# Patient Record
Sex: MALE | Race: WHITE | Employment: UNEMPLOYED | URBAN - METROPOLITAN AREA
[De-identification: names, ages, dates, MRNs, and addresses within clinical notes are randomized per-mention and may not be internally consistent; named-entity substitution may affect disease eponyms.]

---

## 2017-06-11 ENCOUNTER — HOSPITAL ENCOUNTER (INPATIENT)
Age: 57
LOS: 7 days | Discharge: PSYCHIATRIC OR SUBSTANCE USE FACILITY | DRG: 885 | End: 2017-06-19
Attending: EMERGENCY MEDICINE | Admitting: PSYCHIATRY & NEUROLOGY
Payer: OTHER GOVERNMENT

## 2017-06-11 DIAGNOSIS — F10.920 ALCOHOL INTOXICATION, UNCOMPLICATED (HCC): ICD-10-CM

## 2017-06-11 DIAGNOSIS — R45.851 SUICIDAL IDEATION: Primary | ICD-10-CM

## 2017-06-11 LAB
ALBUMIN SERPL BCP-MCNC: 3.7 G/DL (ref 3.4–5)
ALBUMIN/GLOB SERPL: 0.9 {RATIO} (ref 0.8–1.7)
ALP SERPL-CCNC: 128 U/L (ref 45–117)
ALT SERPL-CCNC: 146 U/L (ref 16–61)
AMPHET UR QL SCN: NEGATIVE
ANION GAP BLD CALC-SCNC: 9 MMOL/L (ref 3–18)
AST SERPL W P-5'-P-CCNC: 169 U/L (ref 15–37)
BARBITURATES UR QL SCN: NEGATIVE
BASOPHILS # BLD AUTO: 0 K/UL (ref 0–0.06)
BASOPHILS # BLD: 0 % (ref 0–2)
BENZODIAZ UR QL: NEGATIVE
BILIRUB SERPL-MCNC: 0.4 MG/DL (ref 0.2–1)
BUN SERPL-MCNC: 7 MG/DL (ref 7–18)
BUN/CREAT SERPL: 13 (ref 12–20)
CALCIUM SERPL-MCNC: 8.7 MG/DL (ref 8.5–10.1)
CANNABINOIDS UR QL SCN: NEGATIVE
CHLORIDE SERPL-SCNC: 103 MMOL/L (ref 100–108)
CO2 SERPL-SCNC: 27 MMOL/L (ref 21–32)
COCAINE UR QL SCN: NEGATIVE
CREAT SERPL-MCNC: 0.56 MG/DL (ref 0.6–1.3)
DIFFERENTIAL METHOD BLD: ABNORMAL
EOSINOPHIL # BLD: 0.2 K/UL (ref 0–0.4)
EOSINOPHIL NFR BLD: 3 % (ref 0–5)
ERYTHROCYTE [DISTWIDTH] IN BLOOD BY AUTOMATED COUNT: 14.5 % (ref 11.6–14.5)
ETHANOL SERPL-MCNC: 139 MG/DL (ref 0–3)
ETHANOL SERPL-MCNC: 299 MG/DL (ref 0–3)
GLOBULIN SER CALC-MCNC: 4 G/DL (ref 2–4)
GLUCOSE SERPL-MCNC: 110 MG/DL (ref 74–99)
HCT VFR BLD AUTO: 42.9 % (ref 36–48)
HDSCOM,HDSCOM: NORMAL
HGB BLD-MCNC: 15.5 G/DL (ref 13–16)
LYMPHOCYTES # BLD AUTO: 32 % (ref 21–52)
LYMPHOCYTES # BLD: 2.5 K/UL (ref 0.9–3.6)
MCH RBC QN AUTO: 31.4 PG (ref 24–34)
MCHC RBC AUTO-ENTMCNC: 36.1 G/DL (ref 31–37)
MCV RBC AUTO: 87 FL (ref 74–97)
METHADONE UR QL: NEGATIVE
MONOCYTES # BLD: 0.5 K/UL (ref 0.05–1.2)
MONOCYTES NFR BLD AUTO: 6 % (ref 3–10)
NEUTS SEG # BLD: 4.6 K/UL (ref 1.8–8)
NEUTS SEG NFR BLD AUTO: 59 % (ref 40–73)
OPIATES UR QL: NEGATIVE
PCP UR QL: NEGATIVE
PLATELET # BLD AUTO: 84 K/UL (ref 135–420)
PMV BLD AUTO: 9.7 FL (ref 9.2–11.8)
POTASSIUM SERPL-SCNC: 4.1 MMOL/L (ref 3.5–5.5)
PROT SERPL-MCNC: 7.7 G/DL (ref 6.4–8.2)
RBC # BLD AUTO: 4.93 M/UL (ref 4.7–5.5)
SODIUM SERPL-SCNC: 139 MMOL/L (ref 136–145)
WBC # BLD AUTO: 7.8 K/UL (ref 4.6–13.2)

## 2017-06-11 PROCEDURE — 85025 COMPLETE CBC W/AUTO DIFF WBC: CPT | Performed by: EMERGENCY MEDICINE

## 2017-06-11 PROCEDURE — 99285 EMERGENCY DEPT VISIT HI MDM: CPT

## 2017-06-11 PROCEDURE — 74011250637 HC RX REV CODE- 250/637: Performed by: EMERGENCY MEDICINE

## 2017-06-11 PROCEDURE — 80307 DRUG TEST PRSMV CHEM ANLYZR: CPT | Performed by: EMERGENCY MEDICINE

## 2017-06-11 PROCEDURE — 80053 COMPREHEN METABOLIC PANEL: CPT | Performed by: EMERGENCY MEDICINE

## 2017-06-11 RX ORDER — THERA TABS 400 MCG
1 TAB ORAL
Status: COMPLETED | OUTPATIENT
Start: 2017-06-11 | End: 2017-06-11

## 2017-06-11 RX ORDER — LANOLIN ALCOHOL/MO/W.PET/CERES
100 CREAM (GRAM) TOPICAL
Status: COMPLETED | OUTPATIENT
Start: 2017-06-11 | End: 2017-06-11

## 2017-06-11 RX ORDER — FOLIC ACID 1 MG/1
1 TABLET ORAL
Status: COMPLETED | OUTPATIENT
Start: 2017-06-11 | End: 2017-06-11

## 2017-06-11 RX ADMIN — THERA TABS 1 TABLET: TAB at 14:01

## 2017-06-11 RX ADMIN — FOLIC ACID 1 MG: 1 TABLET ORAL at 14:01

## 2017-06-11 RX ADMIN — Medication 100 MG: at 14:00

## 2017-06-11 NOTE — ED PROVIDER NOTES
HPI Comments: Pt with history of COPD, presents to ED with complaint of suicidal ideations and \"drinking too much\". He denies any current dyspnea, no cough or wheezing. No fevers or chills, no chest pain or palpitations. He states he has been admitted to the behavioral unit before and \"I think I need to come back in\". He denies any history of alcohol withdrawal seizures or DTs. No other acute symptoms or complaints were noted. Past Medical History:   Diagnosis Date    Chronic obstructive pulmonary disease ([facility] [address]        History reviewed. No pertinent surgical history. History reviewed. No pertinent family history. Social History     Social History    Marital status: SINGLE     Spouse name: N/A    Number of children: N/A    Years of education: N/A     Occupational History    Not on file. Social History Main Topics    Smoking status: Current Every Day Smoker    Smokeless tobacco: Not on file    Alcohol use Yes    Drug use: No    Sexual activity: Not on file     Other Topics Concern    Not on file     Social History Narrative         ALLERGIES: Review of patient's allergies indicates no known allergies. Review of Systems   Constitutional: Negative for chills, diaphoresis and fever. HENT: Negative. Eyes: Negative for visual disturbance. Respiratory: Negative for chest tightness and shortness of breath. Cardiovascular: Negative for chest pain, palpitations and leg swelling. Gastrointestinal: Negative for abdominal pain, nausea and vomiting. Endocrine: Negative. Genitourinary: Negative for dysuria and hematuria. Musculoskeletal: Negative. Skin: Negative for pallor. Allergic/Immunologic: Negative. Neurological: Negative for dizziness, syncope and headaches. Psychiatric/Behavioral: Positive for dysphoric mood and suicidal ideas. Negative for self-injury.        Vitals:    06/11/17 1224 06/11/17 1252   BP: (!) 137/94 (!) 132/98   Pulse: 87 88   Resp: 16 16 Temp: 98.2 °F (36.8 °C)    SpO2: 98% 98%   Weight: 68 kg (150 lb)             Physical Exam   Constitutional: He is oriented to person, place, and time. He appears well-developed and well-nourished. No distress. Resting comfortably on stretcher   HENT:   Head: Normocephalic and atraumatic. MM moist   Eyes: Conjunctivae and EOM are normal. No scleral icterus. Sclera clear bilaterally   Neck: Normal range of motion. Neck supple. No JVD present. Non-tender to palpation   Cardiovascular: Normal rate, regular rhythm and normal heart sounds. Exam reveals no gallop and no friction rub. No murmur heard. Pulmonary/Chest: Effort normal and breath sounds normal. No respiratory distress. He has no wheezes. He has no rales. He exhibits no tenderness. No crepitance with palpation   Abdominal: Soft. He exhibits no distension. There is no tenderness. Genitourinary:   Genitourinary Comments: No CVA tenderness   Musculoskeletal: He exhibits no edema or tenderness. Normal inspection of upper extremities. No edema noted to bilateral lower extremities   Lymphadenopathy:     He has no cervical adenopathy. Neurological: He is alert and oriented to person, place, and time. No cranial nerve deficit. He exhibits normal muscle tone. Normal motor and sensation bilaterally to upper and lower extremities   Skin: Skin is warm and dry. No rash noted. He is not diaphoretic. Psychiatric:   Normal mood and affect. +SI   Vitals reviewed. MDM  Number of Diagnoses or Management Options  Alcohol intoxication, uncomplicated ([address]):   Suicidal ideation:   Diagnosis management comments: Pt with history of alcohol abuse, presents with complaint of suicidal ideations. Benign exam.  Pt is medically stable for crisis evaluation. Will consult crisis when sober. 6:32 PM discussed with crisis, will evaluate pt in ED.        Amount and/or Complexity of Data Reviewed  Clinical lab tests: ordered and reviewed  Decide to obtain previous medical records or to obtain history from someone other than the patient: yes  Obtain history from someone other than the patient: yes  Independent visualization of images, tracings, or specimens: yes    Risk of Complications, Morbidity, and/or Mortality  Presenting problems: moderate  Management options: moderate    Patient Progress  Patient progress: stable    ED Course       Procedures    Note:  Assuming care of patient at beginning of shift    7:13 PM  Trav HERMAN MD, assumed care of patient at the beginning of my shift. No current facility-administered medications for this encounter. Current Outpatient Prescriptions   Medication Sig    traZODone (DESYREL) 100 mg tablet Take 1 Tab by mouth nightly as needed for Sleep. Indications: Sleep       Past Medical History:   Diagnosis Date    Chronic obstructive pulmonary disease ([facility] [address]        History reviewed. No pertinent surgical history. History reviewed. No pertinent family history. Social History     Social History    Marital status: SINGLE     Spouse name: N/A    Number of children: N/A    Years of education: N/A     Occupational History    Not on file. Social History Main Topics    Smoking status: Current Every Day Smoker    Smokeless tobacco: Not on file    Alcohol use Yes    Drug use: No    Sexual activity: Not on file     Other Topics Concern    Not on file     Social History Narrative       No Known Allergies    Patient's primary care provider (as noted in EPIC):  None    Abnormal lab results from this emergency department encounter:  Labs Reviewed   CBC WITH AUTOMATED DIFF - Abnormal; Notable for the following:        Result Value    PLATELET 84 (*)     All other components within normal limits   METABOLIC PANEL, COMPREHENSIVE - Abnormal; Notable for the following:     Glucose 110 (*)     Creatinine 0.56 (*)     ALT (SGPT) 146 (*)     AST (SGOT) 169 (*)     Alk.  phosphatase 128 (*)     All other components within normal limits   ETHYL ALCOHOL - Abnormal; Notable for the following:     ALCOHOL(ETHYL),SERUM 299 (*)     All other components within normal limits   DRUG SCREEN, URINE   ETHYL ALCOHOL       Lab values for this patient within approximately the last 12 hours:  Recent Results (from the past 12 hour(s))   CBC WITH AUTOMATED DIFF    Collection Time: 06/11/17 12:35 PM   Result Value Ref Range    WBC 7.8 4.6 - 13.2 K/uL    RBC 4.93 4.70 - 5.50 M/uL    HGB 15.5 13.0 - 16.0 g/dL    HCT 42.9 36.0 - 48.0 %    MCV 87.0 74.0 - 97.0 FL    MCH 31.4 24.0 - 34.0 PG    MCHC 36.1 31.0 - 37.0 g/dL    RDW 14.5 11.6 - 14.5 %    PLATELET 84 (L) 483 - 420 K/uL    MPV 9.7 9.2 - 11.8 FL    NEUTROPHILS 59 40 - 73 %    LYMPHOCYTES 32 21 - 52 %    MONOCYTES 6 3 - 10 %    EOSINOPHILS 3 0 - 5 %    BASOPHILS 0 0 - 2 %    ABS. NEUTROPHILS 4.6 1.8 - 8.0 K/UL    ABS. LYMPHOCYTES 2.5 0.9 - 3.6 K/UL    ABS. MONOCYTES 0.5 0.05 - 1.2 K/UL    ABS. EOSINOPHILS 0.2 0.0 - 0.4 K/UL    ABS. BASOPHILS 0.0 0.0 - 0.06 K/UL    DF AUTOMATED     METABOLIC PANEL, COMPREHENSIVE    Collection Time: 06/11/17 12:35 PM   Result Value Ref Range    Sodium 139 136 - 145 mmol/L    Potassium 4.1 3.5 - 5.5 mmol/L    Chloride 103 100 - 108 mmol/L    CO2 27 21 - 32 mmol/L    Anion gap 9 3.0 - 18 mmol/L    Glucose 110 (H) 74 - 99 mg/dL    BUN 7 7.0 - 18 MG/DL    Creatinine 0.56 (L) 0.6 - 1.3 MG/DL    BUN/Creatinine ratio 13 12 - 20      GFR est AA >60 >60 ml/min/1.73m2    GFR est non-AA >60 >60 ml/min/1.73m2    Calcium 8.7 8.5 - 10.1 MG/DL    Bilirubin, total 0.4 0.2 - 1.0 MG/DL    ALT (SGPT) 146 (H) 16 - 61 U/L    AST (SGOT) 169 (H) 15 - 37 U/L    Alk.  phosphatase 128 (H) 45 - 117 U/L    Protein, total 7.7 6.4 - 8.2 g/dL    Albumin 3.7 3.4 - 5.0 g/dL    Globulin 4.0 2.0 - 4.0 g/dL    A-G Ratio 0.9 0.8 - 1.7     ETHYL ALCOHOL    Collection Time: 06/11/17 12:35 PM   Result Value Ref Range    ALCOHOL(ETHYL),SERUM 299 (H) 0 - 3 MG/DL   DRUG SCREEN, URINE    Collection Time: 06/11/17 12:42 PM   Result Value Ref Range    BENZODIAZEPINE NEGATIVE  NEG      BARBITURATES NEGATIVE  NEG      THC (TH-CANNABINOL) NEGATIVE  NEG      OPIATES NEGATIVE  NEG      PCP(PHENCYCLIDINE) NEGATIVE  NEG      COCAINE NEGATIVE  NEG      AMPHETAMINE NEGATIVE  NEG      METHADONE NEGATIVE  NEG      HDSCOM (NOTE)        Radiologist and cardiologist interpretations if available at time of this note:  Radiology results:  No results found. Medication(s) ordered for patient during this emergency visit encounter:  Medications   folic acid (FOLVITE) tablet 1 mg (1 mg Oral Given 6/11/17 1401)   therapeutic multivitamin (THERAGRAN) tablet 1 Tab (1 Tab Oral Given 6/11/17 1401)   thiamine (B-1) tablet 100 mg (100 mg Oral Given 6/11/17 1400)       Pt care assumed from Dr. Jerod Gan , ED provider. Pt complaint(s), current treatment plan, progression and available diagnostic results have been discussed thoroughly. Rounding occurred: no  Intended Disposition: TBD   Pending diagnostic reports and/or labs (please list): Evaluation by [facility] crisis worker and their recommendations. 11:50 PM  Spoke to [place] crisis worker, and she has no ED coverage. Will see this patient in the AM.     Diagnoses:  1. Suicidal ideation  2. Alcohol abuse    Alexandro Leach M.D. Signout Note      Pt care transferred to Dr. Elvia Mishra  ,ED provider. History of patient complaint(s), available diagnostic reports and current treatment plan has been discussed thoroughly. Bedside rounding on patient occured : no . Intended disposition of patient : TBD  Pending diagnostics reports and/or labs (please list): Logan Castillo Crisis evaluation of patient pending at time of signout. Ginette Leach M.D.

## 2017-06-11 NOTE — IP AVS SNAPSHOT
Summary of Care Report The Summary of Care report has been created to help improve care coordination. Users with access to [facility] or [facility] [facility] (Web-based application) may access additional patient information including the Discharge Summary. If you are not currently a [facility] user and need more information, please call the number listed below in the [place] section and ask to be connected with Medical Records. Facility Information Name Address Phone [address] [facility] [address] [phone] Patient Information Patient Name Sex  Marisol Maria ([id number]) Male 1960 Discharge Information Admitting Provider Service Area Unit Abram Duggan MD / [address] [address] Adult Chem [facility] / [phone] Discharge Provider Discharge Date/Time Discharge Disposition Destination (none) 2017 Morning (Pending) AHR (none) Patient Language Language ENGLISH [13] Hospital Problems as of 2017  Never Reviewed Class Noted - Resolved Last Modified POA Active Problems 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  
  
Non-Hospital Problems as of 2017  Never Reviewed Class Noted - Resolved Last Modified Active Problems Depression  2016 - Present 2016 by Lizandro Sandoval MD  
  Entered by Lizandro Sandoval MD  
  Depression with suicidal ideation  2016 - Present 2016 by Ryanne Nj MD  
  Entered by Ryanne Nj MD  
  
You are allergic to the following No active allergies Current Discharge Medication List  
  
START taking these medications Dose & Instructions Dispensing Information Comments  
 acamprosate 333 mg tablet Commonly known as:  Armando Sam Dose:  333 mg Take 1 Tab by mouth three (3) times daily for 7 days. Indications: ALCOHOLISM Quantity:  21 Tab Refills:  0  
   
 mirtazapine 15 mg tablet Commonly known as:  Marilee Gifford Dose:  15 mg Take 1 Tab by mouth nightly for 7 days. Indications: major depressive disorder Quantity:  7 Tab Refills:  0 CONTINUE these medications which have CHANGED Dose & Instructions Dispensing Information Comments * traZODone 100 mg tablet Commonly known as:  David Joiner What changed:  Another medication with the same name was added. Make sure you understand how and when to take each. Dose:  100 mg Take 1 Tab by mouth nightly as needed for Sleep. Indications: Sleep Quantity:  14 Tab Refills:  0  
   
 * traZODone 100 mg tablet Commonly known as:  David Joiner What changed: You were already taking a medication with the same name, and this prescription was added. Make sure you understand how and when to take each. Dose:  100 mg Take 1 Tab by mouth nightly as needed for Sleep for up to 7 days. Indications: insomnia associated with depression Quantity:  7 Tab Refills:  0  
   
 * Notice: This list has 2 medication(s) that are the same as other medications prescribed for you. Read the directions carefully, and ask your doctor or other care provider to review them with you. CONTINUE these medications which have NOT CHANGED Dose & Instructions Dispensing Information Comments SPIRIVA WITH HANDIHALER 18 mcg inhalation capsule Generic drug:  tiotropium Dose:  1 Cap Take 1 Cap by inhalation two (2) times a day. Refills:  0 Follow-up Information Follow up With Details Comments Contact Info [address] [address] 
[phone] None   None (395) Patient stated that they have no PCP Discharge Instructions BEHAVIORAL HEALTH NURSING DISCHARGE NOTE PATIENT INSTRUCTIONS: 
 
Diet: Regular The discharge information has been reviewed with the patient. The patient verbalized understanding. Patient armband removed and shredded. Chart Review Routing History No Routing History on File

## 2017-06-11 NOTE — IP AVS SNAPSHOT
Shyanne Service 
 
 
 [address] [facility] [place] Patient: Darren Dean MRN: [id number] [facility]:1[birth year]39[birth year] You are allergic to the following No active allergies Recent Documentation Height Weight BMI Smoking Status 1.727 m 74.8 kg 25.09 kg/m2 Current Every Day Smoker Emergency Contacts Name Discharge Info Relation Home Work Mobile None,Given N/A  AT THIS TIME [6] Sherry Randall  Daughter [21]   [phone] About your hospitalization You were admitted on:  June 12, 2017 You last received care in the:  [facility] 1 ADULT CHEM DEP You were discharged on:  June 19, 2017 Unit phone number:  [phone] Why you were hospitalized Your primary diagnosis was:  Not on File Your diagnoses also included:  Alcohol-Induced Depressive Disorder With Moderate Or Severe Use Disorder ([facility]) Providers Seen During Your Hospitalizations Provider Role Specialty Primary office phone Alec Jett MD Attending Provider Emergency Medicine [phone] Steele Siemens, MD Attending Provider Emergency Medicine [phone] Pat Rees DO Attending Provider Emergency Medicine [phone] Carlos Dickerson MD Attending Provider Psychiatry [phone] Your Primary Care Physician (PCP) Primary Care Physician Office Phone Office Fax NONE ** None ** ** None ** Follow-up Information Follow up With Details Comments Contact Info [address] [address] 
[phone] None   None (395) Patient stated that they have no PCP Current Discharge Medication List  
  
START taking these medications Dose & Instructions Dispensing Information Comments Morning Noon Evening Bedtime  
 acamprosate 333 mg tablet Commonly known as:  Jennakarissa Ly Your last dose was: Your next dose is:    
   
   
 Dose:  333 mg Take 1 Tab by mouth three (3) times daily for 7 days. Indications: ALCOHOLISM Quantity:  21 Tab Refills:  0  
     
   
   
   
  
 mirtazapine 15 mg tablet Commonly known as:  Clechalino Bynum Your last dose was: Your next dose is:    
   
   
 Dose:  15 mg Take 1 Tab by mouth nightly for 7 days. Indications: major depressive disorder Quantity:  7 Tab Refills:  0 CONTINUE these medications which have CHANGED Dose & Instructions Dispensing Information Comments Morning Noon Evening Bedtime * traZODone 100 mg tablet Commonly known as:  Lequita Gallus What changed:  Another medication with the same name was added. Make sure you understand how and when to take each. Your last dose was: Your next dose is:    
   
   
 Dose:  100 mg Take 1 Tab by mouth nightly as needed for Sleep. Indications: Sleep Quantity:  14 Tab Refills:  0  
     
   
   
   
  
 * traZODone 100 mg tablet Commonly known as:  Lequita Gallus What changed: You were already taking a medication with the same name, and this prescription was added. Make sure you understand how and when to take each. Your last dose was: Your next dose is:    
   
   
 Dose:  100 mg Take 1 Tab by mouth nightly as needed for Sleep for up to 7 days. Indications: insomnia associated with depression Quantity:  7 Tab Refills:  0  
     
   
   
   
  
 * Notice: This list has 2 medication(s) that are the same as other medications prescribed for you. Read the directions carefully, and ask your doctor or other care provider to review them with you. CONTINUE these medications which have NOT CHANGED Dose & Instructions Dispensing Information Comments Morning Noon Evening Bedtime SPIRIVA WITH HANDIHALER 18 mcg inhalation capsule Generic drug:  tiotropium Your last dose was: Your next dose is: Dose:  1 Cap Take 1 Cap by inhalation two (2) times a day. Refills:  0 Where to Get Your Medications Information on where to get these meds will be given to you by the nurse or doctor. ! Ask your nurse or doctor about these medications  
  acamprosate 333 mg tablet  
 mirtazapine 15 mg tablet  
 traZODone 100 mg tablet Discharge Instructions BEHAVIORAL HEALTH NURSING DISCHARGE NOTE PATIENT INSTRUCTIONS: 
 
Diet: Regular The discharge information has been reviewed with the patient. The patient verbalized understanding. Patient armband removed and shredded. Discharge Orders None Introducing [facility]! Merryshayy Baronmo introduces [employer] patient portal. Now you can access parts of your medical record, email your doctor's office, and request medication refills online. 1. In your internet browser, go to [email or web address] 2. Click on the First Time User? Click Here link in the Sign In box. You will see the New Member Sign Up page. 3. Enter your [employer] Access Code exactly as it appears below. You will not need to use this code after youve completed the sign-up process. If you do not sign up before the expiration date, you must request a new code. · [employer] Access Code: [email or web address] Expires: 6/26/2017  1:29 PM 
 
4. Enter the last four digits of your Social Security Number (xxxx) and Date of Birth (mm/dd/yyyy) as indicated and click Submit. You will be taken to the next sign-up page. 5. Create a [employer] ID. This will be your [employer] login ID and cannot be changed, so think of one that is secure and easy to remember. 6. Create a [employer] password. You can change your password at any time. 7. Enter your Password Reset Question and Answer. This can be used at a later time if you forget your password. 8. Enter your e-mail address.  You will receive e-mail notification when new information is available in [employer]. 9. Click Sign Up. You can now view and download portions of your medical record. 10. Click the Download Summary menu link to download a portable copy of your medical information. If you have questions, please visit the Frequently Asked Questions section of the [employer] website. Remember, [employer] is NOT to be used for urgent needs. For medical emergencies, dial 911. Now available from your iPhone and Android! General Information Please provide this summary of care documentation to your next provider. Patient Signature:  ____________________________________________________________ Date:  ____________________________________________________________  
  
[facility] Provider Signature:  ____________________________________________________________ Date:  ____________________________________________________________

## 2017-06-11 NOTE — ED TRIAGE NOTES
Pt arrived via EMS c/o SOB (pt states hx of COPD) no signs or symptoms of respiratory difficulty. Pt also states he is being evicted and is feeling like he wants to hurt himself but states he has felt that way for years. Pt has hx of admission for addiction ad SI. Pt states he drank 8 beers since midnight, pack a day smoker, denies drug use at this time. Pt smells of ETOH and is disheveled.

## 2017-06-12 PROBLEM — F10.24 ALCOHOL-INDUCED DEPRESSIVE DISORDER WITH MODERATE OR SEVERE USE DISORDER (HCC): Status: ACTIVE | Noted: 2017-06-12

## 2017-06-12 PROCEDURE — 74011250637 HC RX REV CODE- 250/637: Performed by: PSYCHIATRY & NEUROLOGY

## 2017-06-12 PROCEDURE — 65220000005 HC RM SEMIPRIVATE PSYCH 3 OR 4 BED

## 2017-06-12 PROCEDURE — HZ2ZZZZ DETOXIFICATION SERVICES FOR SUBSTANCE ABUSE TREATMENT: ICD-10-PCS | Performed by: PSYCHIATRY & NEUROLOGY

## 2017-06-12 RX ORDER — BENZTROPINE MESYLATE 1 MG/ML
1-2 INJECTION INTRAMUSCULAR; INTRAVENOUS
Status: DISCONTINUED | OUTPATIENT
Start: 2017-06-12 | End: 2017-06-19 | Stop reason: HOSPADM

## 2017-06-12 RX ORDER — CHLORDIAZEPOXIDE HYDROCHLORIDE 25 MG/1
25 CAPSULE, GELATIN COATED ORAL
Status: DISCONTINUED | OUTPATIENT
Start: 2017-06-12 | End: 2017-06-19 | Stop reason: HOSPADM

## 2017-06-12 RX ORDER — LORAZEPAM 1 MG/1
1-2 TABLET ORAL
Status: DISCONTINUED | OUTPATIENT
Start: 2017-06-12 | End: 2017-06-19 | Stop reason: HOSPADM

## 2017-06-12 RX ORDER — HALOPERIDOL 5 MG/1
5 TABLET ORAL
Status: DISCONTINUED | OUTPATIENT
Start: 2017-06-12 | End: 2017-06-19 | Stop reason: HOSPADM

## 2017-06-12 RX ORDER — PROMETHAZINE HYDROCHLORIDE 25 MG/1
25 TABLET ORAL
Status: DISCONTINUED | OUTPATIENT
Start: 2017-06-12 | End: 2017-06-19 | Stop reason: HOSPADM

## 2017-06-12 RX ORDER — BENZTROPINE MESYLATE 1 MG/1
1-2 TABLET ORAL
Status: DISCONTINUED | OUTPATIENT
Start: 2017-06-12 | End: 2017-06-19 | Stop reason: HOSPADM

## 2017-06-12 RX ORDER — LOPERAMIDE HYDROCHLORIDE 2 MG/1
2 CAPSULE ORAL AS NEEDED
Status: DISCONTINUED | OUTPATIENT
Start: 2017-06-12 | End: 2017-06-19 | Stop reason: HOSPADM

## 2017-06-12 RX ORDER — FOLIC ACID 1 MG/1
1 TABLET ORAL DAILY
Status: DISCONTINUED | OUTPATIENT
Start: 2017-06-13 | End: 2017-06-19 | Stop reason: HOSPADM

## 2017-06-12 RX ORDER — IBUPROFEN 600 MG/1
600 TABLET ORAL
Status: DISCONTINUED | OUTPATIENT
Start: 2017-06-12 | End: 2017-06-19 | Stop reason: HOSPADM

## 2017-06-12 RX ORDER — TRAZODONE HYDROCHLORIDE 50 MG/1
50 TABLET ORAL
Status: DISCONTINUED | OUTPATIENT
Start: 2017-06-12 | End: 2017-06-16

## 2017-06-12 RX ORDER — THERA TABS 400 MCG
1 TAB ORAL DAILY
Status: DISCONTINUED | OUTPATIENT
Start: 2017-06-13 | End: 2017-06-19 | Stop reason: HOSPADM

## 2017-06-12 RX ORDER — LORAZEPAM 2 MG/ML
1-2 INJECTION INTRAMUSCULAR
Status: DISCONTINUED | OUTPATIENT
Start: 2017-06-12 | End: 2017-06-19 | Stop reason: HOSPADM

## 2017-06-12 RX ORDER — HALOPERIDOL 5 MG/ML
5 INJECTION INTRAMUSCULAR
Status: DISCONTINUED | OUTPATIENT
Start: 2017-06-12 | End: 2017-06-19 | Stop reason: HOSPADM

## 2017-06-12 RX ORDER — LANOLIN ALCOHOL/MO/W.PET/CERES
100 CREAM (GRAM) TOPICAL DAILY
Status: DISCONTINUED | OUTPATIENT
Start: 2017-06-13 | End: 2017-06-19 | Stop reason: HOSPADM

## 2017-06-12 RX ADMIN — TRAZODONE HYDROCHLORIDE 50 MG: 50 TABLET ORAL at 20:58

## 2017-06-12 NOTE — ED NOTES
TRANSFER - OUT REPORT:    Verbal report given to Jose Molina rn(name) on Deya Kimble  being transferred to behavioral medicine room 126-03(unit) for routine progression of care       Report consisted of patients Situation, Background, Assessment and   Recommendations(SBAR). Information from the following report(s) SBAR, ED Summary and MAR was reviewed with the receiving nurse. Lines:       Opportunity for questions and clarification was provided.       Patient transported with:   [facility]

## 2017-06-12 NOTE — BH NOTES
Patient has been mainly in his room since admission this afternoon. Patient has eaten meals and will answer questions when asked. Patient asked for hygiene products and took a shower.  Patient contracts for safety and denies current SI/HI and A/V/H.

## 2017-06-12 NOTE — BH NOTES
Patient admitted from ED escorted by security,tech with diagnosis of suicidal ideation, alcohol abuse, states he had been drinking too much, last drink yesterday, \"need to come back in\". Came from recent alcohol detox program last month in [address] [place] [address] for past 5 months, was evicted from home, started drinking again. Reports SI without a plan. Pt alert, oriented x 4, pleasant, cooperative during assessment, denies suicidal thoughts or plan or HI at this time. Contracts for safety with staff. Denies pain, shortness of breath or chest pain. Oriented to unit. Encouraged use of gripper socks, socialize with peers and staff, and attend group and activities, engage in treatment plan, be proactive. Pt declined lunch states he just had breakfast and need sleep. Resting in bed. Encouraged notify staff for needs or concerns. Staff monitored pt for health and safety, provided support as needed.

## 2017-06-12 NOTE — ED NOTES
7:29 AM Assumed care of the patient from Dr. Portia Duff MD. Patient with suicidal ideation and alcohol abuse. Alcohol level at 1845 yesterday was 139. Patient resting and cooperative. Pending Crisis Evaluation. 8:12 AM Patient has been evaluated by Jenna Christianson with the Crisis Stabilization team. She states that the pt would prefer to be admitted at the [address] [place] [address], they had no beds over the weekend, but she will attempt to get him a bed today. She states that there was no Crisis Stabilization nurse available overnight as they were on lock down on the adolescent floor. Pt is voluntary for admission here at [facility] psyche. Is admitted here per crisis. SCRIBE ATTESTATION STATEMENT  Documented by: Syed Roberts for, and in the presence of, Herminia Parker MD 7:30 AM     Signed by: Travis Lewis, 06/12/17 7:30 AM     PROVIDER ATTESTATION STATEMENT  I personally performed the services described in the documentation, reviewed the documentation, as recorded by the scribe in my presence, and it accurately and completely records my words and actions.   Herminia Parker MD

## 2017-06-12 NOTE — BH NOTES
Ravin Mitchell is not participating in Recreational Therapy. Group time: 1 hour    Personal goal for participation: fresh air break    Goal orientation: social    Group therapy participation: refuse    Therapeutic interventions reviewed and discussed: Staff encouraged Pt.  To participate in group    Impression of participation: Pt refuse, chose to rest in bed despite staff encouragement

## 2017-06-12 NOTE — BSMART NOTE
60 yo M seen in ED room 12 at the request of Isaac Andrews 33. Alert & O x 4, arrived yesterday intoxicated (BAL=.299) now fully sober, cooperative with interview. Smiles when approached, mild tremor noted, states he is now hungry, that his appetite has been poor due to drinking. Memory & judgement intact, insight fair, pleasant & polite with full range affect. Previously worked as a , unemployed  6 years, [address] [employer] 1979-84. Homeless. CC: \"I'm an alcoholic, relapsed, & I'm suicidal of course\"    States spent 5 months involved with [facility] substance abuse program, moved to Transitional housing, then a hotel room. \"It was hard being back in the real world, one 6-pack and I was right back to drunk every day. \" States he feels really bad about himself and might as well die. Previous suicide attempt 6 months ago by cutting wrists. Has shown wrist scars to this writer x 3. Denies homicidal ideation. Denies A / V hallucinations. Denies drug use x many years, alcohol only. Amount of alcohol varies by availability \"as much as I can get. \" Relapsed 2-6 weeks ago. Vague timeframe given. Unable/unwilling to contract for safety from self-harm if discharged to the streets. States he called his [facility] counselor yesterday and was told he would help get him back into the program. V A had no bed availability over the weekend. Client would prefer admission at the [address] [place] [address] Dr KENNY plummer 2 to [facility] [place] December 2016, released by court from TDO and readmit the same day. Counselor - Jessica 11, [phone]    DISPOSITION: Discussed with . [facility] remains on diversion for psychiatric/CD admissions per Anthony at [phone] ext [phone]. Fax # for clinical information to be sent when bed is available: [phone]. Client made aware of [facility] diversion status and is willing for voluntary admission to [facility] [place] for safety from self-harm.

## 2017-06-12 NOTE — H&P
Admission History and Physical        Patient: Srinivas Sheikh               Sex: male          DOA: 6/11/2017       YOB: 1960      Age:  61 y.o.        LOS:  LOS: 0 days      HPI:     CHIEF COMPLAINT: \"I feel really bad about myself, might as well die\"    HISTORY OF PRESENT ILLNESS:     Mr. Angela Naik is a 61 y.o. single  male with a history of multiple past psychiatric hospitalizations for depressed mood with suicidal ideation and gestures. Patient suffers from alcohol use disorder, when intoxicated exacerbates his depression, hopelessness, worthlessness and helplessness. Patient arrived in the ED yesterday intoxicated with blood alcohol level 0.229, voicing worsening depression with suicidal ideation. Patient stated that he spent 5 months at the [address] [place] [address] substance abuse program, later moving to Transitional housing,then to a hotel room. Patient expressed \" it was hard for me to be back in the real world, one 6 pack and I was right back to being drunk everyday. \" Patient expressed that he was feeling really bad about himself and might as well die. It must be noted that he has presented on at least two prior occasions with similar presentation and was admitted to the [facility] twice in the past. Patient had a previous suicidal attempt some 6 months ago by cutting his wrist.  At present patient is sober, alert and oriented x 4. His affects is somewhat dressed and anxious, his affect mood congruent but reactive, smiling when approached.  Patient continues to endorse suicidal ideation, but stated he would rather be admitted at the [address] [place] [address]. He was informed that the [address] [place] [address] was on diversion currently and since he could not contract for safety, would have to be admitted to this hospital. Patient was then willing to be admitted at [facility]. Patient stated that he called his counselor at the [address] [place] [address] yesterday and was told that efforts would be made to get him back into the substance abuse program at the [place], but again the [place] had no current available beds over the weekend. Noteworthy client was admitted X2 to [facility] [place] in December of 2016, released by court from TDO and readmitted the same day. Patient presented disheveled, intoxicated yesterday, stating that he had been evicted, feeling like he wants to hurt himself, adding that he felt that way for years. As stated above patient has a history of alcohol abuse and chronic suicidality exacerbated by alcohol intoxication. He stated he had been drinking 8 beers since midnight yesterday, smokes 1 pack of cigarettes daily for years, but denied any illicit substance use. Patient will be admitted voluntarily for safety, treatment and stabilization and will receive psychosocial support in term of getting him set up with substance abuse program at the [facility] again or some other similar program in the community. PAST PSYCHIATRIC HISTORY:  History of major depressive disorder,  History of alcohol (substance) related induced mood disorder  History of prior suicide attempt/gesture by cutting wrist.  History of multiple inpatient hospitalizations   History of receiving SARTP at the [facility].    SUBSTANCE ABUSE HISTORY:  History of alcohol (substance) related induced mood disorder  History of multiple inpatient hospitalizations related to alcohol intoxication and depression with suicidal ideation/gestures. History of receiving SARTP at the [facility].    FAMILY HISTORY:  Unknown    SOCIAL HISTORY:  Patient may be homeless at this time  Stated he was living in a hotel earlier but may have gotten evicted     history:  Patient was in the [employer]. Received services at [facility] in the past.  Rank Unknown. Service connection unknown. Discharge status: unknown. Allergies: NKDA    Past Medical History:   Diagnosis Date    Chronic obstructive pulmonary disease ([address]         History reviewed. No pertinent surgical history.     Social History Substance Use Topics    Smoking status: Current Every Day Smoker    Smokeless tobacco: Not on file    Alcohol use Yes        Allergies: NKDA  Prior to Admission medications    Medication Sig Start Date End Date Taking? Authorizing Provider   traZODone (DESYREL) 100 mg tablet Take 1 Tab by mouth nightly as needed for Sleep. Indications: Sleep 12/8/16   Alee Landa MD       REVIEW OF SYSTEMS:     All systems reviewed wnl. Physical Exam:     VITALS:    Vital signs reviewed; most recent are:    Visit Vitals    /81 (BP 1 Location: Right arm, BP Patient Position: Sitting)    Pulse 77    Temp 97 °F (36.1 °C)    Resp 20    Ht 5' 8\" (1.727 m)    Wt 74.8 kg (165 lb)    SpO2 96%    BMI 25.09 kg/m2     SpO2 Readings from Last 6 Encounters:   06/12/17 96%   12/06/16 98%   12/01/16 95%        No intake or output data in the 24 hours ending 06/12/17 1239     Physical Exam: completed and documented in chart. Labs: All labs reviewed. BAL: 0.299 on arrival at the ED yesterday. 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 single  male with a history of multiple past psychiatric hospitalizations for depressed mood with suicidal ideation and gestures. Patient suffers from alcohol use disorder, when intoxicated exacerbates his depression, hopelessness, worthlessness and helplessness. Patient arrived in the ED yesterday intoxicated with blood alcohol level 0.229, voicing worsening depression with suicidal ideation. Patient stated that he spent 5 months at the [facility] substance abuse program, later moving to Transitional housing,then to a hotel room. Patient expressed \" it was hard for me to be back in the real world, one 6 pack and I was right back to being drunk everyday. \" Patient expressed that he was feeling really bad about himself and might as well die.  He will benefit from a short inpatient admission for safety, treatment and stabilization. MENTAL STATUS EXAMINATION: Alert, oriented x 4, Cooperative,  male, looked older than stated age, attired casually, malodorous,  poor hygiene and grooming. Speech was normal, somewhat hypoverbal.Mood appears to be  Depressed, downcast, irritable/anxious, affect was mood congruent flat. The patient denied auditory and visual hallucinations. Thought  Content devoid of delusions, patient admitted to current SI, but denied HI. He stated he frequently have suicidal thoughts  Insight and Judgment was limited. DIAGNOSES     AXIS I: F 33.1 Major Depressive disorder, recurrent, moderate, F10.10 Alcohol use disorder, r/o substance induced mood disorder  AXIS II: No diagnosis. AXIS III: COPD  AXIS IV: Problem with primary support systems. AXIS V: 37.        Plan:    -admit patient to inpatient psychiatry unit for safety, treatment and stabilization. -will place patient on suicidal precautions. -CIWA protocol with librium in place.  Vitals per protocol  -regular diet  -in the past patient was on Remeron (not started at this time)  -in the past patient took Trazodone (not started at this time)  -monitor for alcohol withdrawal as stated above.  -will involve  with regards appropriate psychosocial interventions needed  -will need help with Substance abuse treatment, possibly at the [facility] where he received treatment in the past for alcohol abuse.  -discharge planning in progress per primary team.     Total time spent with patient: 70 mins           ___________________________________________________     Attending Physician: Dane Meeks MD   06/12/17

## 2017-06-13 PROCEDURE — 74011250637 HC RX REV CODE- 250/637: Performed by: PSYCHIATRY & NEUROLOGY

## 2017-06-13 PROCEDURE — 65220000005 HC RM SEMIPRIVATE PSYCH 3 OR 4 BED

## 2017-06-13 RX ORDER — CITALOPRAM 10 MG/1
10 TABLET ORAL DAILY
Status: DISCONTINUED | OUTPATIENT
Start: 2017-06-14 | End: 2017-06-15

## 2017-06-13 RX ORDER — IBUPROFEN 200 MG
1 TABLET ORAL DAILY
Status: DISCONTINUED | OUTPATIENT
Start: 2017-06-13 | End: 2017-06-17

## 2017-06-13 RX ORDER — ACAMPROSATE CALCIUM 333 MG/1
333 TABLET, DELAYED RELEASE ORAL 3 TIMES DAILY
Status: DISCONTINUED | OUTPATIENT
Start: 2017-06-13 | End: 2017-06-19 | Stop reason: HOSPADM

## 2017-06-13 RX ORDER — DIPHENHYDRAMINE HCL 50 MG
50 CAPSULE ORAL
Status: COMPLETED | OUTPATIENT
Start: 2017-06-13 | End: 2017-06-13

## 2017-06-13 RX ORDER — IBUPROFEN 200 MG
1 TABLET ORAL DAILY
Status: DISCONTINUED | OUTPATIENT
Start: 2017-06-13 | End: 2017-06-13

## 2017-06-13 RX ADMIN — TRAZODONE HYDROCHLORIDE 50 MG: 50 TABLET ORAL at 20:57

## 2017-06-13 RX ADMIN — ACAMPROSATE CALCIUM 333 MG: 333 TABLET, DELAYED RELEASE ORAL at 20:54

## 2017-06-13 RX ADMIN — IBUPROFEN 600 MG: 600 TABLET ORAL at 23:38

## 2017-06-13 RX ADMIN — DIPHENHYDRAMINE HYDROCHLORIDE 50 MG: 50 CAPSULE ORAL at 17:12

## 2017-06-13 RX ADMIN — Medication 100 MG: at 08:40

## 2017-06-13 RX ADMIN — THERA TABS 1 TABLET: TAB at 08:41

## 2017-06-13 RX ADMIN — FOLIC ACID 1 MG: 1 TABLET ORAL at 08:41

## 2017-06-13 RX ADMIN — LORAZEPAM 1 MG: 1 TABLET ORAL at 23:38

## 2017-06-13 NOTE — BH NOTES
SW Contact: Pt. is a 61year old male  admitted to this facility detox and ideations to harm self. Pt. has a history of depression, suicidal attempts  and alcohol dependency. Pt. was admitted to this facility for detox. SW and the treating psychiatrist met with the pt.  to discuss d/c planning. The pt. admits that he relapsed on alcohol. Pt stated he was at the [address] [place] [address] [facility] program at the [facility] for the last 5 months. Pt. Stated he relapsed on alcohol a little over a week ago. Pt. Stated he was depressed, expressed having racing thoughts, impaired sleep and thoughts to harm self. Pt. Reflected on the progress and efforts he made when he was participating in the [address] [place] [address].  SW and treating psychiatrist discussed positive self reflections. The team engaged pt with  positive self talk. The pt. Stated he was melita to get a job at the hospital as a . Pt. Talked about his daughters are supportive. The pt. Stated his daughter want him to move to [place], to be closer to [facility]. Pt. Than started to reflect on the possibility of turnaround to [place] in a negative way. The team discussed continued recovery and relapse prevention, medication and aftercare. Pt stated he is willing to return the [facility] program for continued stabilization. JOSE ALFREDO will contact Jesu Alonzo Hess the  at the [facility] to address transfer.

## 2017-06-13 NOTE — BH NOTES
GROUP THERAPY PROGRESS NOTE    Olu Johnson is participating in [place] group. Group time:  30 minutes    Personal goal for participation: things to be thankful for    Goal orientation: personal    Group therapy participation: active    Therapeutic interventions reviewed and discussed:   Things Im thankful for    Impression of participation:  Im thankful for getting help for my problems

## 2017-06-13 NOTE — PROGRESS NOTES
Problem: Suicide/Homicide (Adult/Pediatric)  Goal: *STG: Verbalizes alternative ways of dealing with maladaptive feelings/behaviors  Outcome: Progressing Towards Goal  Pt verbalizes alternative ways of dealing with maladaptive behaviors/feelings  Goal: *STG/LTG: No longer expresses self destructive or suicidal/homicidal thoughts  Outcome: Progressing Towards Goal  Pt denies thoughts of self destructive or homicidal/suicidal thoughts    Problem: Alcohol Withdrawal  Goal: *STG: Attends activities and groups  Outcome: Progressing Towards Goal  Pt attends group and activities    Comments:   Patient calm, cooperative, pleasant, compliant with medication therapy. Interacts with staff, In and out of [facility], eats 100% meals, stays in room most of day. Encouraged to interact with staff and peers at [facility], pt states he had not slept for 3rd day and do not like the brightness and noise of day area. Pt requesting to talk to doctor about sleeping medication. Doctor informed with orders pt may have medication this afternoon for sleep. Pt denies thoughts of self harm or harm to others, contracts for safety. Pt states he feels some depression because of loss of house, but will take care of it and make some phone calls. Pt attends some group and activities. Staff continues to monitor health and safety, use of gripper socks and provide supportive therapeutic environment.

## 2017-06-13 NOTE — BH NOTES
Behavioral Health Progress Note        6/13/2017     Ramya Cagle    Current Diagnosis: AXIS I: F 33.1 Major Depressive disorder, recurrent, moderate, F10.10 Alcohol use disorder, r/o substance induced mood disorder    Report from the nursing staff changes in the medical condition while patient has been on the unit:      Recent Results    No results found for this or any previous visit (from the past 24 hour(s)). Sleep: shows no evidence of impairment. Interval History:  Patient is a 61 y.o. single  male with a history of multiple past psychiatric hospitalizations for depressed mood with suicidal ideation and gestures. Patient suffers from alcohol use disorder, when intoxicated exacerbates his depression, hopelessness, worthlessness and helplessness. Patient arrived in the ED the day prior admission intoxicated with blood alcohol level 0.229, voicing worsening depression with suicidal ideation. Patient stated that he spent 2 months at the [facility] substance abuse program, later moving to Transitional housing,then to a hotel room. Patient expressed \" it was hard for me to be back in the real world, one 6 pack and I was right back to being drunk every day. \" Patient expressed that he was feeling really bad about himself and might as well die. He was admitted for safety, treatment and stabilization. Patient has been cooperative with treatment; he has been polite and has been future oriented. He stated he hopes to resume AA meetings and attend outpatient substance abuse program at the [facility] [place]. Patient stated he has been drinking since age 14, and at this time he finds no foster in drinking. He seems to be tolerating withdrawal from alcohol and tolerating his medication regimen well. No adverse side effects noted, his vital signs have been stable. Will continue with CIWA protocol, Will continue to monitor his progress. Patient is future oriented, but at times feels hopeless.  Patient would like to have a safe place to live and to be able to afford his food and rent, but wonders who would employ him, as he believes himself to be older and weaker that the competition. He does have two grown daughters but does not want to be a burden to them. Patient denied SI/HI, currently denies AVH or tactile hallucinations. Overall some improvement in mood since admission. Mental Status Exam: improved mood, less depressed, Affect mood congruent/restrictive. I/J remain adequate. Treatment Plan:  -[facility] protocol in place  - will start celexa 10 mg po daily  -will start acamprosate 333 mg po tid  - will explore housing options and VA benefits  - will explore availability of SARTP at the [place] on behalf of patient. Anticipated Discharge: 3-4 days.        Dada Guerrero MD  6/13/2017

## 2017-06-13 NOTE — H&P
Psychiatry History and Physical    Subjective:     Date of Evaluation:  6/13/2017    Reason for Referral:  Cristin Wolf was referred to the examiners from ER/MV for SI and ETOH Detox. History of Presenting Problem: 56y/o C male in NAD well developed and nourished. Reports is still suicidal and has not slept the last 3 days. Prior admit to [facility] 2016. 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 [address] 06/12/2017    Depression with suicidal ideation 12/06/2016    Depression 12/01/2016     Past Medical History:   Diagnosis Date    Chronic obstructive pulmonary disease ([facility]      History reviewed. No pertinent surgical history. History reviewed. No pertinent family history. Social History   Substance Use Topics    Smoking status: Current Every Day Smoker    Smokeless tobacco: Not on file    Alcohol use Yes     Prior to Admission medications    Medication Sig Start Date End Date Taking? Authorizing Provider   tiotropium (SPIRIVA WITH HANDIHALER) 18 mcg inhalation capsule Take 1 Cap by inhalation two (2) times a day. Historical Provider   traZODone (DESYREL) 100 mg tablet Take 1 Tab by mouth nightly as needed for Sleep.  Indications: Sleep 12/8/16   Mansi Snyder MD     No Known Allergies     Review of Systems - History obtained from chart review and the patient  General ROS: positive for  - sleep disturbance  Psychological ROS: positive for - depression, sleep disturbances and suicidal ideation  Respiratory ROS: positive for - cough and shortness of breath  Cardiovascular ROS: no chest pain or dyspnea on exertion  Gastrointestinal ROS: no abdominal pain, change in bowel habits, or black or bloody stools  Genito-Urinary ROS: no dysuria, trouble voiding, or hematuria  Musculoskeletal ROS: positive for - muscle pain  Neurological ROS: positive for - numbness/tingling with pain to right foot  Dermatological ROS: negative      Objective:     No data found. Mental Status exam: WNL except for    Sensorium  oriented to time, place and person   Orientation person, place, time/date and situation   Relations cooperative   Eye Contact appropriate   Appearance:  age appropriate and older than stated age   Motor Behavior:  gait stable and within normal limits   Speech:  normal volume   Vocabulary average   Thought Process: logical   Thought Content free of delusions and free of hallucinations   Suicidal ideations intention   Homicidal ideations no plan  and no intention   Mood:  depressed   Affect:  depressed   Memory recent  adequate   Memory remote:  adequate   Concentration:  adequate   Abstraction:  concrete   Insight:  good   Reliability good   Judgment:  good         Physical Exam:   Visit Vitals    /81 (BP 1 Location: Right arm, BP Patient Position: Sitting)    Pulse 77    Temp 97 °F (36.1 °C)    Resp 20    Ht 5' 8\" (1.727 m)    Wt 74.8 kg (165 lb)    SpO2 96%    BMI 25.09 kg/m2     General appearance: alert, cooperative, no distress, appears stated age  Head: Normocephalic, without obvious abnormality, atraumatic  Eyes: negative  Ears: normal TM's and external ear canals AU  Nose: Nares normal. Septum midline. Mucosa normal. No drainage or sinus tenderness. Throat: Lips, mucosa, and tongue normal. Teeth and gums normal  Neck: supple, symmetrical, trachea midline, no adenopathy, thyroid: not enlarged, symmetric, no tenderness/mass/nodules, no carotid bruit and no JVD  Back: symmetric, no curvature. ROM normal. No CVA tenderness. Lungs: clear to auscultation bilaterally  Chest wall: no tenderness  Heart: regular rate and rhythm, S1, S2 normal, no murmur, click, rub or gallop  Abdomen: soft, non-tender.  Bowel sounds normal. No masses,  no organomegaly  Extremities: extremities normal, atraumatic, no cyanosis or edema  Pulses: 2+ and symmetric  Skin: Skin color, texture, turgor normal. No rashes or lesions  Lymph nodes: Cervical, supraclavicular, and axillary nodes normal.  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       [employer], [place]   6/13/2017 8:42 AM

## 2017-06-13 NOTE — BSMART NOTE
OCCUPATIONAL THERAPY PROGRESS NOTE  Group Time:  [id number]  Attendance: The patient attended full group. Participation:  The patient participated fully in the activity. Attention:  The patient was able to focus on the activity. Interaction:  The patient occasionally  interacts with others. Discussed substance abuse issue, some difficulty he has with AA/NA he has attended (part of [place] program) and his tendency to want to be alone often. Encouraged to look at alternatives,(therapy, small AA group, appropriate sponsor) to help facilitate recovery. Some rationalizations. Did not mention role drinking may have had on family, did mention putting daughters' through college. Discussed working and some resentment he has to do so for now.

## 2017-06-13 NOTE — BH NOTES
GROUP THERAPY PROGRESS NOTE    Domenic Arnett is participating in [place].      Group time: 30 minutes    Personal goal for participation: discuss guideline compliance, unit issues and community announcements; discuss post discharge plans/goals    Goal orientation: community    Group therapy participation: active

## 2017-06-13 NOTE — BH NOTES
Behavioral Health Progress Note        6/13/2017     Domenic Arnett    Current Diagnosis: AXIS I: F 33.1 Major Depressive disorder, recurrent, moderate, F10.10 Alcohol use disorder, r/o substance induced mood disorder    Report from the nursing staff changes in the medical condition while patient has been on the unit:      Recent Results    No results found for this or any previous visit (from the past 24 hour(s)). Sleep: shows no evidence of impairment. Interval History:  Patient is a 61 y.o. single  male with a history of multiple past psychiatric hospitalizations for depressed mood with suicidal ideation and gestures. Patient suffers from alcohol use disorder, when intoxicated exacerbates his depression, hopelessness, worthlessness and helplessness. Patient arrived in the ED the day prior admission intoxicated with blood alcohol level 0.229, voicing worsening depression with suicidal ideation. Patient stated that he spent 2 months at the [facility] substance abuse program, later moving to Transitional housing,then to a hotel room. Patient expressed \" it was hard for me to be back in the real world, one 6 pack and I was right back to being drunk every day. \" Patient expressed that he was feeling really bad about himself and might as well die. He was admitted for safety, treatment and stabilization. Patient has been cooperative with treatment; he has been polite and has been future oriented. He stated he hopes to resume AA meetings and attend outpatient substance abuse program at the [facility]. Patient stated he has been drinking since age 14, and at this time he finds no foster in drinking. He seems to be tolerating withdrawal from alcohol and tolerating his medication regimen well. No adverse side effects noted, his vital signs have been stable. Will continue with CIWA protocol, Will continue to monitor his progress. Patient is future oriented, but at times feels hopeless.  Patient would like to have a safe place to live and to be able to afford his food and rent, but wonders who would employ him, as he believes himself to be older and weaker that the competition. He does have two grown daughters but does not want to be a burden to them. Patient denied SI/HI, currently denies AVH or tactile hallucinations. Overall some improvement in mood since admission. Mental Status Exam: improved mood, less depressed, Affect mood congruent/restrictive. I/J remain adequate. Treatment Plan:  -[facility] protocol in place  - will start celexa 10 mg po daily  -will start acamprosate 333 mg po  TID  - will explore housing options and VA benefits  - will explore availability of SARTP at the [place] on behalf of patient. Anticipated Discharge: 3-4 days.        Azra Lang MD  6/13/2017

## 2017-06-14 PROCEDURE — 65220000005 HC RM SEMIPRIVATE PSYCH 3 OR 4 BED

## 2017-06-14 PROCEDURE — 74011250637 HC RX REV CODE- 250/637: Performed by: PSYCHIATRY & NEUROLOGY

## 2017-06-14 RX ADMIN — TRAZODONE HYDROCHLORIDE 50 MG: 50 TABLET ORAL at 21:16

## 2017-06-14 RX ADMIN — Medication 100 MG: at 08:13

## 2017-06-14 RX ADMIN — FOLIC ACID 1 MG: 1 TABLET ORAL at 08:13

## 2017-06-14 RX ADMIN — ACAMPROSATE CALCIUM 333 MG: 333 TABLET, DELAYED RELEASE ORAL at 21:14

## 2017-06-14 RX ADMIN — ACAMPROSATE CALCIUM 333 MG: 333 TABLET, DELAYED RELEASE ORAL at 08:13

## 2017-06-14 RX ADMIN — CITALOPRAM HYDROBROMIDE 10 MG: 10 TABLET ORAL at 08:13

## 2017-06-14 RX ADMIN — LORAZEPAM 1 MG: 1 TABLET ORAL at 21:16

## 2017-06-14 RX ADMIN — THERA TABS 1 TABLET: TAB at 08:13

## 2017-06-14 RX ADMIN — ACAMPROSATE CALCIUM 333 MG: 333 TABLET, DELAYED RELEASE ORAL at 13:47

## 2017-06-14 NOTE — BH NOTES
GROUP THERAPY PROGRESS NOTE    Marysol Childress is participating in [place]. Group time: 20 minutes    Personal goal for participation:Reflect on today    Goal orientation: community    Group therapy participation: minimal    Therapeutic interventions reviewed and discussed: Discuss unit guidelines,unit/housekeeping issues, and achievement of personal goal for today    Impression of participation: Pt had few words to say, but did not voice any concerns regarding issues. Flat affect.

## 2017-06-14 NOTE — BH NOTES
JOSE ALFREDO Collateral:  JOSE ALFREDO contacted Parviz Chowdhury transition coordinator at the [facility] @ [phone] wia [phone]. Mrs. Peter Savage was not available. JOSE ALFREDO left a message.

## 2017-06-14 NOTE — BH NOTES
Behavioral Health Progress Note        6/14/2017     Patricia Miller     Current Diagnosis: AXIS I: F 33.1 Major Depressive disorder, recurrent, moderate, F10.10 Alcohol use disorder, r/o substance induced mood disorder     Report from the nursing staff changes in the medical condition while patient has been on the unit:       Recent Results    No results found for this or any previous visit (from the past 24 hour(s)).      Sleep: shows no evidence of impairment.     Interval History:  Patient is a 61 y.o. single  male with a history of multiple past psychiatric hospitalizations for depressed mood with suicidal ideation and gestures. Patient suffers from alcohol use disorder, when intoxicated exacerbates his depression, hopelessness, worthlessness and helplessness. Patient arrived in the ED the day prior admission intoxicated with blood alcohol level 0.229, voicing worsening depression with suicidal ideation. Patient stated that he spent 2 months at the [facility] substance abuse program, later moving to Transitional housing,then to a hotel room. Patient expressed \" it was hard for me to be back in the real world, one 6 pack and I was right back to being drunk every day. \" Patient expressed that he was feeling really bad about himself and might as well die. He was admitted for safety, treatment and stabilization.     Patient has been cooperative with treatment; he has been polite and has been future oriented. He stated he hopes to resume AA meetings and attend outpatient substance abuse program at the [address] [place] [address]. Patient stated he has been drinking since age 14, and at this time he finds no foster in drinking. He seems to be tolerating withdrawal from alcohol and tolerating his medication regimen well. No adverse side effects noted, his vital signs have been stable. Will continue with CIWA protocol, Will continue to monitor his progress. Patient is future oriented, but at times feels hopeless.  Patient would like to have a safe place to live and to be able to afford his food and rent, but wonders who would employ him, as he believes himself to be older and weaker that the competition. He does have two grown daughters but does not want to be a burden to them. Patient denied SI/HI, currently denies AVH or tactile hallucinations. Overall some improvement in mood since admission. No interval changed today. Patient's mood seems to be improving and he is more interactive with peers on the unit. He seems to have tolerated medications added yesterday for mood and for alcohol cravings, denies any adverse side effects. He denies SI/HI/AVH today.     Mental Status Exam: improved mood, less depressed, Affect mood congruent/restrictive.  I/J remain adequate.     Treatment Plan:  -WA protocol in place  - will start celexa 10 mg po daily  -will start acamprosate 333 mg po TID  - will explore housing options and VA benefits  - will explore availability of SARTP at the [place] on behalf of patient.     Anticipated Discharge: 2-3 days.       Paulette Conklin MD  6/14/2017

## 2017-06-14 NOTE — BH NOTES
Patient requested medication for anxiety and for headache, patient given Ativan for anxiety and Motrin for headache will continue to monitor.

## 2017-06-14 NOTE — BH NOTES
Phi Mukherjee is  participating in [place]. Group time: 15 minutes    Personal goal for participation: Community announcement    Goal orientation: community    Group therapy participation: fully participated    Therapeutic interventions reviewed and discussed: Staff discussed  Staff discussed the Mental Health programs offered. Unit schedule for groups,  Visiting hours, patient advocate name and phone number and where this information is posted on the unit. , etc,,. Report any maintenance/housekeeping or treatment concerns to staff so it can be addressed. Impression of participation: Pt.did not have any maintenance/housekeeping or treatment concerns to report to staff .

## 2017-06-14 NOTE — PROGRESS NOTES
Problem: Alcohol Withdrawal  Goal: *STG: Participates in treatment plan  Outcome: Progressing Towards Goal  Patient participates in his treatment. Goal: *STG: Complies with medication therapy  Outcome: Progressing Towards Goal  Patient takes medications as ordered,   Goal: *STG: Attends activities and groups  Outcome: Not Progressing Towards Goal  Patient has not been attending groups. Comments:   Patient has been mainly in his room today but does come out for meals and medications. Patient is compliant with medications and was complaining earlier of being \"a little out of it\" but thought it was due to getting something to help him sleep around midnight. Patient was given 1mg of Ativan at 11:30pm last evening and said that is the first time he has really slept for several days. Patient was wanting to rest today in the hopes of getting caught up a little with his missing sleep. Patient denies SI/HI and has not had any withdrawal symptoms today.

## 2017-06-14 NOTE — BH NOTES
GROUP THERAPY PROGRESS NOTE    Fortino Bonilla is not participating in [facility]Creative Group, despite staff encouragement. Pt chose to sit in the day area and read.

## 2017-06-14 NOTE — BH NOTES
GROUP THERAPY PROGRESS NOTE    Yasir Alvarez is participating in Goals Group and Community. Group time: 30 minutes    Personal goal for participation: continue to participate in treatment,setting goals, and taking some    responsibility for self. Goal orientation: personal    Group therapy participation: minimal    Therapeutic interventions reviewed and discussed:     Impression of participation: sat quietly in group voicing no complaints.

## 2017-06-14 NOTE — BH NOTES
Pt demeanor seemed a little flat this evening; in the day area, isolative. PT spent most of the evening reading to himself. This writer asked how his day was going and Pt stated that he's just tired; \"haven't slept in three days\". Encouraged Pt to use relaxation techniques to calm down and get some rest tonight; Pt complied. Pt did smile throughout the shift; was not a management issue. Pt did not have visitors this shift; tried to call a loved one, but there was no answer. Pt ate all of his dinner meal and and snack. . Pt is utilizing non-skid footwear and is free of falls. Pt denies SI,HI, AH, and VH at this time. Pt contracts for safety on the unit. Will continue to encourage Pt throughout the regiment.

## 2017-06-15 PROCEDURE — 65220000005 HC RM SEMIPRIVATE PSYCH 3 OR 4 BED

## 2017-06-15 PROCEDURE — 74011250637 HC RX REV CODE- 250/637: Performed by: PSYCHIATRY & NEUROLOGY

## 2017-06-15 RX ORDER — CITALOPRAM 20 MG/1
20 TABLET, FILM COATED ORAL DAILY
Status: DISCONTINUED | OUTPATIENT
Start: 2017-06-16 | End: 2017-06-17

## 2017-06-15 RX ADMIN — FOLIC ACID 1 MG: 1 TABLET ORAL at 08:19

## 2017-06-15 RX ADMIN — CITALOPRAM HYDROBROMIDE 10 MG: 10 TABLET ORAL at 08:20

## 2017-06-15 RX ADMIN — ACAMPROSATE CALCIUM 333 MG: 333 TABLET, DELAYED RELEASE ORAL at 20:46

## 2017-06-15 RX ADMIN — ACAMPROSATE CALCIUM 333 MG: 333 TABLET, DELAYED RELEASE ORAL at 08:16

## 2017-06-15 RX ADMIN — ACAMPROSATE CALCIUM 333 MG: 333 TABLET, DELAYED RELEASE ORAL at 13:57

## 2017-06-15 RX ADMIN — TRAZODONE HYDROCHLORIDE 50 MG: 50 TABLET ORAL at 20:48

## 2017-06-15 RX ADMIN — THERA TABS 1 TABLET: TAB at 08:16

## 2017-06-15 RX ADMIN — Medication 100 MG: at 08:16

## 2017-06-15 NOTE — BSMART NOTE
OCCUPATIONAL THERAPY PROGRESS NOTE  Group Time:  1015  Attendance: The patient attended full group. Participation:  The patient participated with moderate elaboration in the activity  Attention:  Able to attend to group/topic. Interaction:  The patient frequently interacts with others. Participated as asked. Superficial at times.

## 2017-06-15 NOTE — BH NOTES
Pt in milieu most of shift, mood calm, affect fair, cooperative and compliant, insight moderate, focused on (per pt)\"holding on to everything I picked up here and try to help myself be better\", interaction good, no behavioral issues during shift. Denies intent to harm self/others and A/V hallucinations, involved in no falls this shift - skid resistant footwear utilized and floor kept free of items that could precipitate a fall.

## 2017-06-15 NOTE — PROGRESS NOTES
Problem: Suicide/Homicide (Adult/Pediatric)  Goal: *STG: Verbalizes alternative ways of dealing with maladaptive feelings/behaviors  Outcome: Progressing Towards Goal  Pt verbalize alternative ways of dealing with maladaptive behaviors/feelings  Goal: *STG/LTG: No longer expresses self destructive or suicidal/homicidal thoughts  Patient will talk with staff every shift while awake re: feelings of self destructive behavior or suicidal or homicidal thoughts throughout hospital stay. Outcome: Progressing Towards Goal  Pt no longer express, denies self destructive or homicidal thoughts     Problem: Alcohol Withdrawal  Goal: *STG: Complies with medication therapy  Patient will take prescribed medications as ordered every day throughout hospital stay. Outcome: Progressing Towards Goal  Pt compliant with medication therapy    Comments:   Patient calm, cooperative, respectful. Visible in milieu, observed at times reading a book. Interacts with peers and staff, interested, engaged staff in conversation about previous pleasant experience at the [facility]. He remembers and uttered one [place] word, states it was 30 yrs ago when he was assigned at the area. Compliant with medications. Attends group and activities. Denies self destructive or suicidal/homicidal thoughts, contracts for safety with staff. Denies A/V hallucination. Verbalizes alternative ways of dealing with maladaptive behaviors/feelings, including using positive coping skills, engaging in interesting activities like reading a book and attending groups and activities. States he was able to sleep for some time last night. Staff continues to monitor health and safety and provide supportive therapeutic environment.

## 2017-06-15 NOTE — BH NOTES
Pt has had an uneventful evening. Pt interacting appropriately with pt and staff. Pt compliant with medication. No problems or issues voiced this shift. Pt monitored for safety. Non skid slippers intact to prevent fall. Staff will continue to monitor.

## 2017-06-15 NOTE — BH NOTES
Pt pleasant, interested, reading book at [employer], attends group and activities. Denies pain, withdrawal symptoms, states Im all right. VS checked q 4. Aware to notify staff for problems or concerns. Denies SI/HI, hallucinations, contracts for safety with staff.

## 2017-06-15 NOTE — BH NOTES
GROUP THERAPY PROGRESS NOTE    Christopher Winters is participating in Leisure-Creative Group. Group time: 45 minutes    Personal goal for participation: Morale Booster    Goal orientation: social    Group therapy participation: passive    Therapeutic interventions reviewed and discussed: Pt had a choice between going outside for fresh air and playing a game of PALMYRA with staff and peers. Pt chose to observe a game of PALMYRA. Impression of participation: Pt observed staff and peers engaged in playing a game of PALMYRA, but was intermittently reading.

## 2017-06-15 NOTE — BSMART NOTE
ART THERAPY GROUP PROGRESS NOTE    Group time:1345  . The patient was unavailable for group. In the shower. Call bell

## 2017-06-15 NOTE — BH NOTES
GROUP THERAPY PROGRESS NOTE    Gretel Hernández is participating in [place].      Group time: 30 minutes    Personal goal for participation: discuss guideline compliance, unit issues and community announcements    Goal orientation: community    Group therapy participation: active

## 2017-06-15 NOTE — BH NOTES
GROUP THERAPY PROGRESS NOTE    Darren Dean is participating in [address] Group    Group time: 1hour  Personal goal for participation:  Seek information on Alcohol      Goal orientation: active  Group therapy participation:   Fully participated    Therapeutic interventions reviewed and discussed: Staff encouraged Pt. To participate in Group    Impression of participation:  [address] members reviewed a film, then held an open discussion with Pt.  Pt. Sobeida Canchola and received feedback while in group

## 2017-06-15 NOTE — BH NOTES
Redd Nevarez is  participating in [place]. Group time: 15 minutes    Personal goal for participation: Community announcement    Goal orientation: community    Group therapy participation: fully participated    Therapeutic interventions reviewed and discussed: Staff discussed  Staff discussed the Mental Health programs offered. Unit schedule for groups,  Visiting hours, patient advocate name and phone number and where this information is posted on the unit. , etc,,. Report any maintenance/housekeeping or treatment concerns to staff so it can be addressed. Impression of participation: Pt.did not have any maintenance/housekeeping or treatment concerns to report to staff .

## 2017-06-15 NOTE — BH NOTES
JOSE ALFREDO Collateral:  JOSE ALFREDO talked to Mrs. Docia Duane  with [facility].  Mrs. Dewayne Goldberg stated their are currently no beds available at this time. JOSE ALFREDO contacted [facility] staff in charge of the domiciliary program at the [facility].  . [facility] was not available. SW left a message. JOSE ALFREDO Contact: Pt. is a 61year old male  admitted to this facility detox and ideations to harm self. Pt. has a history of depression, suicidal attempts  and alcohol dependency. Pt. was admitted to this facility for detox. SW met with the pt. to discuss d/c planning. Pt. provided pt with an update on the above programs. Pt expressed he would really like to go back to the [address] [place] [address] [place] program. SW discussed positive coping skills, positive goal setting, self-efficacy and relapse prevention. SW had pt to reflect on his strengths and recent accomplishments. Pt is showing improvement. Pt denies ideations and hallucinations.

## 2017-06-16 PROCEDURE — 65220000005 HC RM SEMIPRIVATE PSYCH 3 OR 4 BED

## 2017-06-16 PROCEDURE — 74011250637 HC RX REV CODE- 250/637: Performed by: PSYCHIATRY & NEUROLOGY

## 2017-06-16 RX ORDER — TRAZODONE HYDROCHLORIDE 100 MG/1
100 TABLET ORAL
Status: DISCONTINUED | OUTPATIENT
Start: 2017-06-16 | End: 2017-06-19 | Stop reason: HOSPADM

## 2017-06-16 RX ADMIN — FOLIC ACID 1 MG: 1 TABLET ORAL at 08:24

## 2017-06-16 RX ADMIN — TRAZODONE HYDROCHLORIDE 100 MG: 100 TABLET ORAL at 21:43

## 2017-06-16 RX ADMIN — Medication 100 MG: at 08:24

## 2017-06-16 RX ADMIN — ACAMPROSATE CALCIUM 333 MG: 333 TABLET, DELAYED RELEASE ORAL at 08:24

## 2017-06-16 RX ADMIN — ACAMPROSATE CALCIUM 333 MG: 333 TABLET, DELAYED RELEASE ORAL at 13:11

## 2017-06-16 RX ADMIN — CITALOPRAM HYDROBROMIDE 20 MG: 20 TABLET ORAL at 08:24

## 2017-06-16 RX ADMIN — THERA TABS 1 TABLET: TAB at 08:24

## 2017-06-16 RX ADMIN — ACAMPROSATE CALCIUM 333 MG: 333 TABLET, DELAYED RELEASE ORAL at 20:43

## 2017-06-16 NOTE — PROGRESS NOTES
NUTRITION    Nutrition Screen      RECOMMENDATIONS / PLAN:     - No nutrition intervention indicated at this time. Will re-screen as appropriate. NUTRITION DIAGNOSIS & INTERVENTIONS:     Nutrition Diagnosis:  No nutrition diagnosis at this time    ASSESSMENT:     Patient reported good appetite and meal intake. Denied having concerns at this time. Average intake adequate to meet patients estimated nutritional needs:   [x] Yes     [] No    Diet: DIET REGULAR    Food Allergies:  None known   Chewing/Swallowing Difficulty:  [x] None known     [] Yes:   Current Appetite:   [x] Good     [] Fair     [] Poor     [] Other:  Appetite/meal intake prior to admission:   [] Good     [] Fair     [] Poor     [x] Other: unknown   Current Meal Intake: No data found. Gastrointestinal Issues:  [] Yes    [x] No   Skin Integrity:  WDL    Pertinent Medications:  Reviewed   Labs:  Reviewed     Anthropometrics:  Ht Readings from Last 1 Encounters:   06/12/17 5' 8\" (1.727 m)       Last 3 Recorded Weights in this Encounter    06/11/17 1224 06/12/17 1212   Weight: 68 kg (150 lb) 74.8 kg (165 lb)       Body mass index is 25.09 kg/(m^2).       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 [address]         Education Needs:        [x] None identified  [] Identified - Not appropriate at this time  []  Identified and addressed - refer to education log  Learning Limitations:   [x] None identified  [] Identified    Cultural, Buddhist & ethnic food preferences identified:  [x] None    [] Yes      ESTIMATED NUTRITION NEEDS:     4937-2110 kcal (MSJx1.2-1.3), 60-75 gm protein (0.8-1 gm/kg), 1 mL/kcal  Based on:  75 kg       [x] Actual BW      [] IBW          MONITORING & EVALUATION:     Nutrition Goal(s):   1. Po intake of meals will meet >75% of patient estimated nutritional needs within the next 7 days.   Outcome:   [] Met    []  Not Met   [x] New/Initial Goal    Monitor:  [x] Meal intake    [x] Diet tolerance    [] Supplement intake    Previous Recommendations (for follow-up assessments only):     []   Implemented       []   Not Implemented (RD to address)         Discharge Planning: regular diet   [x]  Participated in care planning, discharge planning, & interdisciplinary rounds as appropriate      Kayla Mejia, [address]   Pager: [phone]

## 2017-06-16 NOTE — BSMART NOTE
OCCUPATIONAL THERAPY PROGRESS NOTE  Group Time:  2339  Attendance: The patient attended full group. Participation:  The patient participated with minimal elaboration in the activity. Attention:  The patient was able to focus on the activity. Interaction:  The patient occasionally  interacts with others.   Responses superficial.

## 2017-06-16 NOTE — BH NOTES
Pt has been in the day area for the majority of the shift; has attended and participated in most of the group sessions. Pt is very calm and helpful; has not been a management problem or required redirection. Pt is social with select peers. Pt socializes with staff when prompted, but will take the time to speak in detail when asked a question. Pt states that his day has gone very well; has not had any visitors this shift. His biggest concern is not getting adequate sleep at night; took off his nicotine patch and stated that seems to help a lot. This writer informed the Pt to let the night staff know when he's awake while during rounds, otherwise he's assumed to be sleeping when his eyes are closed and lying still. Pt complied with request. Pt states that the groups help some, but he's been to so many over the course of his previous hospitalizations that \"he's heard it all before\". Encouraged Pt to utilize the tools because hearing it isn't sufficient enough. Pt agreed. Pt is utilizing non-skid footwear and is free of falls. Denies SI, HI, AVH. Pt contracts for safety on the unit and agrees to come to staff if he's feeling unsafe. Will continue to monitor.

## 2017-06-16 NOTE — BH NOTES
Behavioral Health Progress Note          6/16/2017      Gretel Hernández      Current Diagnosis: AXIS I: F 33.1 Major Depressive disorder, recurrent, moderate, F10.10 Alcohol use disorder, r/o substance induced mood disorder      Report from the nursing staff changes in the medical condition while patient has been on the unit:         Recent Results    No results found for this or any previous visit (from the past 24 hour(s)).       Sleep: shows no evidence of impairment.      Interval History:  Patient is a 61 y.o. single  male with a history of multiple past psychiatric hospitalizations for depressed mood with suicidal ideation and gestures. Patient suffers from alcohol use disorder, when intoxicated exacerbates his depression, hopelessness, worthlessness and helplessness. Patient arrived in the ED the day prior admission intoxicated with blood alcohol level 0.229, voicing worsening depression with suicidal ideation. Patient stated that he spent 2 months at the [facility] substance abuse program, later moving to Transitional housing,then to a hotel room. Patient expressed \" it was hard for me to be back in the real world, one 6 pack and I was right back to being drunk every day. \" Patient expressed that he was feeling really bad about himself and might as well die. He was admitted for safety, treatment and stabilization.      Patient has been cooperative with treatment; he has been polite and has been future oriented. He stated he hopes to resume AA meetings and attend outpatient substance abuse program at the [address] [place] [address]. Patient stated he has been drinking since age 14, and at this time he finds no foster in drinking. He seems to be tolerating withdrawal from alcohol and tolerating his medication regimen well. No adverse side effects noted, his vital signs have been stable. Will continue with CIWA protocol, Will continue to monitor his progress. Patient is future oriented, but at times feels hopeless.  Patient would like to have a safe place to live and to be able to afford his food and rent, but wonders who would employ him, as he believes himself to be older and weaker that the competition. He does have two grown daughters but does not want to be a burden to them. Patient denied SI/HI, currently denies AVH or tactile hallucinations. Overall continued sustained improvement in mood since admission. We are looking at Discharge on Monday.      No interval changed today. Patient's mood seems to be improving and he is more interactive with peers on the unit. Patient complained of poor sleep we will adjust Trazodone from 50 mg po qhs to 100 mg qhs for insomnia. He seems to have tolerated medications added yesterday for mood and for alcohol cravings, denies any adverse side effects. He denies SI/HI/AVH today.      Mental Status Exam: improved mood, less depressed, Affect mood congruent/restrictive. I/J remain adequate.      Treatment Plan:  -[facility] protocol in place  - will start celexa 10 mg po daily  -will start acamprosate 333 mg po TID  - will explore housing options and VA benefits  - will continue to explore availability of SARTP at the [place] on behalf of patient.   - spoke to Mr. Alonzo Hess at the [place] in [place], [address] [place] [address] the     [facility] program, patient has an appointment on Monday 06/19/17  - we are looking at discharge on Monday coming.

## 2017-06-16 NOTE — PROGRESS NOTES
Problem: Suicide/Homicide (Adult/Pediatric)  Goal: *STG/LTG: No longer expresses self destructive or suicidal/homicidal thoughts  Patient will talk with staff every shift while awake re: feelings of self destructive behavior or suicidal or homicidal thoughts throughout hospital stay. Outcome: Progressing Towards Goal  Pt denies self destructive or suicidal/homicidal thoughts    Problem: Alcohol Withdrawal  Goal: *STG: Complies with medication therapy  Patient will take prescribed medications as ordered every day throughout hospital stay. Outcome: Progressing Towards Goal  Pt compliant with medication therapy  Goal: *STG: Attends activities and groups  Patient will attend at least 2 groups/activities every day throughout hospital stay. Outcome: Progressing Towards Goal  Pt attends group and activities    Comments:   Patient calm, cooperative, pleasant. Visible in milieu, goes to room intermittently during the day. Interacts with staff when approached. Complies with medication therapy. Pt attends group and activities. Pt denies self destructive, suicidal/homicidal thoughts. He denies hallucinations. Contracts with safety. Pt states he feels better and is doing good, looking forward to discharge Monday. Staff continue to monitor safety and provide support as needed.

## 2017-06-16 NOTE — BH NOTES
Behavioral Health Progress Note          6/16/2017      Srinivas Nichole      Current Diagnosis: AXIS I: F 33.1 Major Depressive disorder, recurrent, moderate, F10.10 Alcohol use disorder, r/o substance induced mood disorder      Report from the nursing staff changes in the medical condition while patient has been on the unit:         Recent Results    No results found for this or any previous visit (from the past 24 hour(s)).       Sleep: shows no evidence of impairment.      Interval History:  Patient is a 61 y.o. single  male with a history of multiple past psychiatric hospitalizations for depressed mood with suicidal ideation and gestures. Patient suffers from alcohol use disorder, when intoxicated exacerbates his depression, hopelessness, worthlessness and helplessness. Patient arrived in the ED the day prior admission intoxicated with blood alcohol level 0.229, voicing worsening depression with suicidal ideation. Patient stated that he spent 2 months at the [facility] substance abuse program, later moving to Transitional housing,then to a hotel room. Patient expressed \" it was hard for me to be back in the real world, one 6 pack and I was right back to being drunk every day. \" Patient expressed that he was feeling really bad about himself and might as well die. He was admitted for safety, treatment and stabilization.      Patient has been cooperative with treatment; he has been polite and has been future oriented. He stated he hopes to resume AA meetings and attend outpatient substance abuse program at the [facility] [place]. Patient stated he has been drinking since age 14, and at this time he finds no foster in drinking. He seems to be tolerating withdrawal from alcohol and tolerating his medication regimen well. No adverse side effects noted, his vital signs have been stable. Will continue with CIWA protocol, Will continue to monitor his progress. Patient is future oriented, but at times feels hopeless.  Patient would like to have a safe place to live and to be able to afford his food and rent, but wonders who would employ him, as he believes himself to be older and weaker that the competition. He does have two grown daughters but does not want to be a burden to them. Patient denied SI/HI, currently denies AVH or tactile hallucinations. Overall continued sustained improvement in mood since admission. We are looking at Discharge on Monday.      No interval changed today. Patient's mood seems to be improving and he is more interactive with peers on the unit. Patient complained of poor sleep we will adjust Trazodone from 50 mg po qhs to 100 mg qhs for insomnia. He seems to have tolerated medications added yesterday for mood and for alcohol cravings, denies any adverse side effects. He denies SI/HI/AVH today.      Mental Status Exam: improved mood, less depressed, Affect mood congruent/restrictive.  I/J remain adequate.      Treatment Plan:  -CIWA protocol in place  - will start celexa 10 mg po daily  -will start acamprosate 333 mg po TID  - will explore housing options and VA benefits  - will continue to explore availability of SARTP at the [place] on behalf of patient.   - spoke to Mr. Lloyd Lopez at the [facility] in [place], no available beds as yet, exploring the  Domiciliary program  Behavioral Health Progress Note          6/15/2017      Kellie Richter      Current Diagnosis: AXIS I: F 33.1 Major Depressive disorder, recurrent, moderate, F10.10 Alcohol use disorder, r/o substance induced mood disorder      Report from the nursing staff changes in the medical condition while patient has been on the unit:         Recent Results    No results found for this or any previous visit (from the past 24 hour(s)).       Sleep: shows no evidence of impairment.      Interval History:  Patient is a 61 y.o. single  male with a history of multiple past psychiatric hospitalizations for depressed mood with suicidal ideation and gestures. Patient suffers from alcohol use disorder, when intoxicated exacerbates his depression, hopelessness, worthlessness and helplessness. Patient arrived in the ED the day prior admission intoxicated with blood alcohol level 0.229, voicing worsening depression with suicidal ideation. Patient stated that he spent 2 months at the [facility] substance abuse program, later moving to Transitional housing,then to a hotel room. Patient expressed \" it was hard for me to be back in the real world, one 6 pack and I was right back to being drunk every day. \" Patient expressed that he was feeling really bad about himself and might as well die. He was admitted for safety, treatment and stabilization.      Patient has been cooperative with treatment; he has been polite and has been future oriented. He stated he hopes to resume AA meetings and attend outpatient substance abuse program at the [address] [place] [address]. Patient stated he has been drinking since age 14, and at this time he finds no foster in drinking. He seems to be tolerating withdrawal from alcohol and tolerating his medication regimen well. No adverse side effects noted, his vital signs have been stable. Will continue with [facility] protocol, Will continue to monitor his progress. Patient is future oriented, but at times feels hopeless. Patient would like to have a safe place to live and to be able to afford his food and rent, but wonders who would employ him, as he believes himself to be older and weaker that the competition. He does have two grown daughters but does not want to be a burden to them. Patient denied SI/HI, currently denies AVH or tactile hallucinations. Overall continued sustained improvement in mood since admission.      No interval changed today. Patient's mood seems to be improving and he is more interactive with peers on the unit.   He seems to have tolerated medications added yesterday for mood and for alcohol cravings, denies any adverse side effects. He denies SI/HI/AVH today.      Mental Status Exam: improved mood, less depressed, Affect mood congruent/restrictive. I/J remain adequate.      Treatment Plan:  -CIWA protocol in place  - will start celexa 10 mg po daily  -will start acamprosate 333 mg po TID  - will explore housing options and VA benefits  - will continue to explore availability of SARTP at the [place] on behalf of patient.   - spoke to Mr. Kristina Valdez at the [facility] in [place],  Appointment arrange for Monday coming at the Domiciliary program at the [place]. Anticipated Discharge: 2-3 days.         Harpreet Oakley MD  6/16/2017  Carol Ann Harrell            Anticipated Discharge: 2-3 days.         Harpreet Oakley MD  6/15/2017  Lilly Hameed

## 2017-06-16 NOTE — BH NOTES
JOSE ALFREDO Collateral: JOSE ALFREDO contacted Mr. Chiqui Be x2 at [facility] @ [phone] staff in charge of the domiciliary program at the [facility]. Mr. Chiqui Be was not available. SW left a messages . JOSE ALFREDO Contact: Pt. is a 61year old male  admitted to this facility detox and ideations to harm self. Pt. has a history of depression, suicidal attempts and alcohol dependency. Pt. was admitted to this facility for detox. SW met with the pt. to discuss d/c planning. The pt. Stated he was feeling better emotionally. SW discussed positive goal setting, relapse prevention and safety plan. Pt stated he plans to return the [facility] program for SA residential at [facility]. Pt.is currently denying ideations and hallucinations. JOSE ALFREDO assisted the pt with contacting [facility] .  SW and pt talked to Mr. Champagne. Pt. Can enter the program on 6/19/17 @ 12:30.

## 2017-06-17 PROCEDURE — 74011250637 HC RX REV CODE- 250/637: Performed by: PSYCHIATRY & NEUROLOGY

## 2017-06-17 PROCEDURE — 65220000005 HC RM SEMIPRIVATE PSYCH 3 OR 4 BED

## 2017-06-17 RX ORDER — MIRTAZAPINE 15 MG/1
15 TABLET, FILM COATED ORAL
Status: DISCONTINUED | OUTPATIENT
Start: 2017-06-17 | End: 2017-06-19 | Stop reason: HOSPADM

## 2017-06-17 RX ORDER — HYDROXYZINE PAMOATE 50 MG/1
50 CAPSULE ORAL
Status: DISCONTINUED | OUTPATIENT
Start: 2017-06-17 | End: 2017-06-19 | Stop reason: HOSPADM

## 2017-06-17 RX ORDER — IBUPROFEN 200 MG
1 TABLET ORAL DAILY
Status: DISCONTINUED | OUTPATIENT
Start: 2017-06-17 | End: 2017-06-19 | Stop reason: HOSPADM

## 2017-06-17 RX ADMIN — TRAZODONE HYDROCHLORIDE 100 MG: 100 TABLET ORAL at 21:30

## 2017-06-17 RX ADMIN — ACAMPROSATE CALCIUM 333 MG: 333 TABLET, DELAYED RELEASE ORAL at 14:21

## 2017-06-17 RX ADMIN — LORAZEPAM 1 MG: 1 TABLET ORAL at 21:30

## 2017-06-17 RX ADMIN — CITALOPRAM HYDROBROMIDE 20 MG: 20 TABLET ORAL at 08:24

## 2017-06-17 RX ADMIN — FOLIC ACID 1 MG: 1 TABLET ORAL at 08:24

## 2017-06-17 RX ADMIN — MIRTAZAPINE 15 MG: 15 TABLET, FILM COATED ORAL at 21:31

## 2017-06-17 RX ADMIN — Medication 100 MG: at 08:24

## 2017-06-17 RX ADMIN — ACAMPROSATE CALCIUM 333 MG: 333 TABLET, DELAYED RELEASE ORAL at 21:31

## 2017-06-17 RX ADMIN — LORAZEPAM 1 MG: 1 TABLET ORAL at 02:26

## 2017-06-17 RX ADMIN — THERA TABS 1 TABLET: TAB at 08:24

## 2017-06-17 RX ADMIN — ACAMPROSATE CALCIUM 333 MG: 333 TABLET, DELAYED RELEASE ORAL at 08:24

## 2017-06-17 NOTE — BH NOTES
The pt's chart was reviewed and he was seen during rounds. He c/o \"lack of sleep\" and anxiety. The pt reports having slept ~~5hrs last night, which he states was more than he been getting this past week. He plans to be transferred to the [place] on Monday. He denies SI and HI. He also denies psychosis. The pt discharge summary from his last admission was reviewed. His sleep and anxiety were improved with Remeron. He is agreeable to restarting this med. MSE-  64yo WM. Appears stated age. Cooperative. Speech is normal in rate, volume, and tone. Stated mood is, \"I'm kind of fuzzy this morning. \"  Affect is full. Thoughts are goal-directed. Denies SI and HI. Denies psychosis. Insight and judgment are fair. A/P-MDD, recurrent, moderate; Alcohol use disorder  1. Discontinue Celexa. Start Remeron 15mg qhs to target sleep and anxiety. 2. Add Vistaril PRN. 3. Continue hospitalization.

## 2017-06-17 NOTE — PROGRESS NOTES
Problem: Suicide/Homicide (Adult/Pediatric)  Goal: *STG/LTG: No longer expresses self destructive or suicidal/homicidal thoughts  Patient will talk with staff every shift while awake re: feelings of self destructive behavior or suicidal or homicidal thoughts throughout hospital stay. Outcome: Progressing Towards Goal  No suicidal or homicidal ideations expressed    Problem: Alcohol Withdrawal  Goal: *STG: Complies with medication therapy  Patient will take prescribed medications as ordered every day throughout hospital stay. Outcome: Progressing Towards Goal  Compliant with all morning medications    Problem: Falls - Risk of  Goal: *Absence of falls  Patient will remain fall free every shift throughout hospital stay. Outcome: Progressing Towards Goal  No falls reported/observed    Comments:   Patient was compliant with all morning medications. He denies suicidal and homicidal ideations. Denies AVH. States he took off his nicotine patch because it was keeping him up. Decreased the strength of nicotine patch but he stated he did not want it with him morning medications. He will ask for it when he needs it. He went to lay in his bed after breakfast to rest and came back out to the day area and was reading. He has been pleasant and cooperative, interacting with peers, staff and nurses appropriately. Will remain on suicide watch, continue to monitor q15 minutes for safety. Staff and nurses will continue to provide a safe and supportive environment.

## 2017-06-17 NOTE — BH NOTES
Patient spent the majority of this shift in day room reading and participating in milieu activities. Patient watched a movie with peers in the evening and attended group. No concerns voiced to this writer.

## 2017-06-18 PROCEDURE — 74011250637 HC RX REV CODE- 250/637: Performed by: PSYCHIATRY & NEUROLOGY

## 2017-06-18 PROCEDURE — 65220000005 HC RM SEMIPRIVATE PSYCH 3 OR 4 BED

## 2017-06-18 RX ADMIN — TRAZODONE HYDROCHLORIDE 100 MG: 100 TABLET ORAL at 20:40

## 2017-06-18 RX ADMIN — ACAMPROSATE CALCIUM 333 MG: 333 TABLET, DELAYED RELEASE ORAL at 14:39

## 2017-06-18 RX ADMIN — MIRTAZAPINE 15 MG: 15 TABLET, FILM COATED ORAL at 20:40

## 2017-06-18 RX ADMIN — Medication 100 MG: at 08:10

## 2017-06-18 RX ADMIN — FOLIC ACID 1 MG: 1 TABLET ORAL at 08:10

## 2017-06-18 RX ADMIN — THERA TABS 1 TABLET: TAB at 08:10

## 2017-06-18 RX ADMIN — ACAMPROSATE CALCIUM 333 MG: 333 TABLET, DELAYED RELEASE ORAL at 08:10

## 2017-06-18 RX ADMIN — ACAMPROSATE CALCIUM 333 MG: 333 TABLET, DELAYED RELEASE ORAL at 20:40

## 2017-06-18 NOTE — PROGRESS NOTES
Problem: Suicide/Homicide (Adult/Pediatric)  Goal: *STG/LTG: No longer expresses self destructive or suicidal/homicidal thoughts  Patient will talk with staff every shift while awake re: feelings of self destructive behavior or suicidal or homicidal thoughts throughout hospital stay. Outcome: Progressing Towards Goal  Pt will no longer express self destructive or suicidal/homicidal ideations daily during this hospital admission. Problem: Alcohol Withdrawal  Goal: *STG: Participates in treatment plan  Outcome: Progressing Towards Goal  Pt will participate in treatment plan daily during this hospital admission. Goal: *STG: Complies with medication therapy  Patient will take prescribed medications as ordered every day throughout hospital stay. Outcome: Progressing Towards Goal  Pt will comply with medication regimen daily during this hospital admission. Problem: Falls - Risk of  Goal: *Absence of falls  Patient will remain fall free every shift throughout hospital stay. Outcome: Progressing Towards Goal  Pt will remain free from falls daily during this hospital admission    Comments:   Pt denies suicidal and homicidal ideations. He has been pleasant and cooperative, socializing with peers and staff appropriately. Staff and nurses will continue to provide a safety and supportive environment.

## 2017-06-18 NOTE — BH NOTES
Patient participated in all unit activities this shift. He enjoys reading in between scheduled activities, either quietly in bed, or sitting in the day area. Patient is social with peers and cooperative with staff. He states he is not currently have thoughts of self harm and that he will come talk to staff if needed. Patient has a somewhat flat, depressed affect. He did not receive any visitors this shift.

## 2017-06-18 NOTE — BH NOTES
GROUP THERAPY PROGRESS NOTE    Kellie Richter is participating in [place].      Group time: 30 minutes    Personal goal for participation: verify insight and reality orientation, discuss unit issues and guideline compliance    Goal orientation: personal    Group therapy participation: active

## 2017-06-18 NOTE — BH NOTES
GROUP THERAPY PROGRESS NOTE    Hanh Jean is participating in Positive thoughts. Group time: 45 minutes    Therapeutic interventions reviewed and discussed:   Patients picked cards and read positive quotes and discussed them. Impression of participation:   Patient participated fully in group and gave feedback to peers.

## 2017-06-18 NOTE — BH NOTES
The pt's chart was reviewed and he was seen during rounds. He reports having slept well last pm and states he slept all night. He tolerated Remeron without difficulty. He denies SI and HI.      MSE-  57yo WM. Appears stated age. Cooperative. Lying in bed. Seen at his bedside. Speech is normal in rate, volume, and tone. Stated mood is, \"I'm doing fine. \" Affect is full. Thoughts are goal-directed. Denies SI and HI. Denies psychosis. Insight and judgment are fair.      A/P-MDD, recurrent, moderate; Alcohol use disorder  1. Continue Remeron 15mg qhs. 2.Continue hospitalization.

## 2017-06-18 NOTE — BH NOTES
Pt low-key, isolative, in room in bed most of shift; mood and affect calm, cooperative and compliant, insight moderate, interaction minimal, hygiene fair, focused on (per pt) \"trying to keep doing good for myself when I get discharged\". No behavioral issues during shift, denies intent to harm self/others and A/V hallucinations, pt involved in no falls this shift - skid resistant footwear utilized and floor kept free of items that could precipitate a fall.

## 2017-06-19 VITALS
HEART RATE: 67 BPM | RESPIRATION RATE: 16 BRPM | DIASTOLIC BLOOD PRESSURE: 64 MMHG | OXYGEN SATURATION: 96 % | HEIGHT: 68 IN | TEMPERATURE: 97.1 F | SYSTOLIC BLOOD PRESSURE: 98 MMHG | WEIGHT: 165 LBS | BODY MASS INDEX: 25.01 KG/M2

## 2017-06-19 PROCEDURE — 74011250637 HC RX REV CODE- 250/637: Performed by: PSYCHIATRY & NEUROLOGY

## 2017-06-19 RX ORDER — ACAMPROSATE CALCIUM 333 MG/1
333 TABLET, DELAYED RELEASE ORAL 3 TIMES DAILY
Qty: 21 TAB | Refills: 0 | Status: SHIPPED | OUTPATIENT
Start: 2017-06-19 | End: 2017-06-26

## 2017-06-19 RX ORDER — MIRTAZAPINE 15 MG/1
15 TABLET, FILM COATED ORAL
Qty: 7 TAB | Refills: 0 | Status: SHIPPED | OUTPATIENT
Start: 2017-06-19 | End: 2017-06-26

## 2017-06-19 RX ORDER — TRAZODONE HYDROCHLORIDE 100 MG/1
100 TABLET ORAL
Qty: 7 TAB | Refills: 0 | Status: SHIPPED | OUTPATIENT
Start: 2017-06-19 | End: 2017-06-26

## 2017-06-19 RX ADMIN — Medication 100 MG: at 08:24

## 2017-06-19 RX ADMIN — ACAMPROSATE CALCIUM 333 MG: 333 TABLET, DELAYED RELEASE ORAL at 08:24

## 2017-06-19 RX ADMIN — FOLIC ACID 1 MG: 1 TABLET ORAL at 08:24

## 2017-06-19 RX ADMIN — THERA TABS 1 TABLET: TAB at 08:24

## 2017-06-19 NOTE — BH NOTES
GROUP THERAPY PROGRESS NOTE    Ludin Vera is not participating in Positive thoughts. Group time: 30 minutes    Patient was encouraged to attend but was in the shower at the time and did not attend.

## 2017-06-19 NOTE — BH NOTES
GROUP THERAPY PROGRESS NOTE    Olu Johnson is participating in [place]. Group time: 30 minutes    Personal goal for participation: rules/ regualtions    Goal orientation: community    Group therapy participation: active    Therapeutic interventions reviewed and discussed: He was educated on effective coping skills to help you during this shift. Impression of participation: He was receptive to the rules and regulations during group this shift he appeared to be focused on getting help with his transition to the [facility] this shift.

## 2017-06-19 NOTE — BH NOTES
Patient discharged from facility with staff at [phone] to be taken to [facility] office. Patient has discharge instructions, prescriptions, and belongings. Denied suicidal and homicidal ideations. Denies AVH.

## 2017-06-19 NOTE — BH NOTES
SW Contact: Pt. is a 61year old male  admitted to this facility detox and ideations to harm self. Pt. has a history of depression, suicidal attempts and alcohol dependency. Pt. was admitted to this facility for detox. SW met with the pt. to discuss d/c plan. Pt. stated he plans to go to [place] today for the dom program. Pt. has an appointment @ 1:00 for his intake assessment. Pt. expressed do is going to be a part of his recovery plan. SW discussed positive coping skills, safety plan and aftercare. Pt. met with treating psychiatrist and SW. The team discussed continued sobriety, self-efficacy and following thru with treatment. Pt. was d/c to the dom program. Pt was assisted by the [facility] staff with transportation to the [facility] program. Pt. Denied ideations and hallucinations.

## 2017-06-19 NOTE — DISCHARGE INSTRUCTIONS
BEHAVIORAL HEALTH NURSING DISCHARGE NOTE    PATIENT INSTRUCTIONS:    Diet: Regular    The discharge information has been reviewed with the patient. The patient verbalized understanding. Patient armband removed and shredded.

## 2017-06-25 NOTE — DISCHARGE SUMMARY
[facility]  Discharge Summary     Patient ID:  Guadalupe Gonzalez  [id number]  23 y.o.  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 Discharged Condition: Stable, denied SI/HI/AVH, contracted for safety in the community at the time of discharge. Past Medical History:   Diagnosis Date    Chronic obstructive pulmonary disease ([facility]       History reviewed. No pertinent family history. Social History   Substance Use Topics    Smoking status: Current Every Day Smoker    Smokeless tobacco: Not on file    Alcohol use Yes     History reviewed. No pertinent surgical history. Prior to Admission medications    Medication Sig Start Date End Date Taking? Authorizing Provider   acamprosate (CAMPRAL) 333 mg tablet Take 1 Tab by mouth three (3) times daily for 7 days. Indications: ALCOHOLISM 6/19/17 6/26/17 Yes Danisha Schroeder MD   mirtazapine (REMERON) 15 mg tablet Take 1 Tab by mouth nightly for 7 days. Indications: major depressive disorder 6/19/17 6/26/17 Yes Danisha Schroeder MD   traZODone (DESYREL) 100 mg tablet Take 1 Tab by mouth nightly as needed for Sleep for up to 7 days.  Indications: insomnia associated with depression 6/19/17 6/26/17 Yes Danisha Schroeder MD   tiotropium (SPIRIVA WITH HANDIHALER) 18 mcg inhalation capsule Take 1 Cap by inhalation two (2) times a day. Historical Provider   traZODone (DESYREL) 100 mg tablet Take 1 Tab by mouth nightly as needed for Sleep. Indications: Sleep 12/8/16   Parviz Marte MD     No Known Allergies     Hospital Course:  Mr. Lizbeth Enamorado is a 61 y.o. single  male with a history of multiple past psychiatric hospitalizations for depressed mood with suicidal ideation and gestures. Patient suffers from alcohol use disorder, when intoxicated exacerbates his depression, hopelessness, worthlessness and helplessness. Patient arrived in the ED yesterday intoxicated with blood alcohol level 0.229, voicing worsening depression with suicidal ideation. Patient stated that he spent 5 months at the [facility] substance abuse program, later moving to Transitional housing,then to a hotel room. Patient expressed \" it was hard for me to be back in the real world, one 6 pack and I was right back to being drunk everyday. \" Patient expressed that he was feeling really bad about himself and might as well die. It must be noted that he has presented on at least two prior occasions with similar presentation and was admitted to the [facility] twice in the past. Patient had a previous suicidal attempt some 6 months ago by cutting his wrist.  At present patient is sober, alert and oriented x 4. His affects is somewhat dressed and anxious, his affect mood congruent but reactive, smiling when approached.  Patient continues to endorse suicidal ideation, but stated he would rather be admitted at the [facility]. He was informed that the [place] was on diversion currently and since he could not contract for safety, would have to be admitted to this hospital. Patient was then willing to be admitted at [facility]. Patient stated that he called his counselor at the [facility] yesterday and was told that efforts would be made to get him back into the substance abuse program at the [place], but [facility]. Noteworthy client was admitted X2 to [facility] [place] in December of 2016, released by court from [facility] and readmitted the same day. Patient presented disheveled, intoxicated yesterday, stating that he had been evicted, feeling like he wants to hurt himself, adding that he felt that way for years. As stated above patient has a history of alcohol abuse and chronic suicidality exacerbated by alcohol intoxication. He stated he had been drinking 8 beers since midnight yesterday, smokes 1 pack of cigarettes daily for years, but denied any illicit substance use. Patient will be admitted voluntarily for safety, treatment and stabilization and will receive psychosocial support in term of getting him set up with substance abuse program at the [facility] or some other similar program in the community. Patient has been cooperative with treatment; he has been polite and has been future oriented. He stated he hopes to resume AA meetings and attend outpatient substance abuse program at the [facility] [place]. Patient stated he has been drinking since age 14, and at this time he finds no foster in drinking. He seems to be tolerating withdrawal from alcohol and tolerating his medication regimen well. No adverse side effects noted, his vital signs have been stable. Will continue with CIWA protocol. Patient is future oriented, but at times feels hopeless. Patient would like to have a safe place to live and to be able to afford his food and rent, but wonders who would employ him, as he believes himself to be older and weaker that the competition. He does have two grown daughters but does not want to be a burden to them. Patient denied SI/HI, currently denies AVH or tactile hallucinations. Overall continued sustained improvement in mood since admission. Patient complained of poor sleep we will adjust Trazodone from 50 mg po qhs to 100 mg qhs for insomnia.   He seems to have tolerated medications added for mood and for alcohol cravings (Acamprosate), denies any adverse side effects. He denies SI/HI/AVH today on the day of discharge. PAST PSYCHIATRIC HISTORY:  History of major depressive disorder,  History of alcohol (substance) related induced mood disorder  History of prior suicide attempt/gesture by cutting wrist.  History of multiple inpatient hospitalizations   History of receiving SARTP at the [facility].     SUBSTANCE ABUSE HISTORY:  History of alcohol (substance) related induced mood disorder  History of multiple inpatient hospitalizations related to alcohol intoxication and depression with suicidal ideation/gestures. History of receiving SARTP at the [facility].     FAMILY HISTORY:  Unknown     SOCIAL HISTORY:  Patient may be homeless at this time  Stated he was living in a hotel earlier but may have gotten evicted      history:  Patient was in the [employer]. Received services at [facility] in the past.  Rank Unknown. Service connection unknown. Discharge status: unknown. Allergies: NKDA  Discharge Exams:  Mental Status exam:   Alert and oriented x 4  Mood pensive, but euthymic, affect reactive and smiling. Thought process, goal directed, logical  Thought contend, devoid of delusions, devoid of hallucinations, denied SI/HI. Future oriented  Speech was normal in volume, rate, tone, and prosody. Insight and judgment were adequate and improved.     Physical exam: completed at time of admission  Chest X-Ray: n/a  ECG: none    Lab/Data Review:  Reviewed were wnl      Consultations (including impressions and outcomes): none    Psychiatric Testing  none    Treatment and Response:  -responded well to psychotropic medications  -responded well to unit therapeutic groups and activities  -responded well to counseling by the primary treatment team  -responded well to psychosocial intervention made by his primary treatment team.  -wanted to participate in outpatient substance abuse treatment program at the [facility] [place]. Significant adverse reaction to drugs: none reported, none observed    Procedures/Operations: none    Discharge medications:  - celexa 10 mg po daily  -acamprosate 333 mg po TID      Activity: full range  Diet: regular      Follow-up with:  - explored housing options and VA benefits  - explored availability of SARTP at the [address] [place] [address] on behalf of patient.  - spoke to Mr. Roberts Seats at the [address] [place] [address] in [address] the    Domiciliary program, patient has an appointment on Monday 06/19/17  -Pt. Will follow-up 6/19/17 @1:00 with intake for the domiciliary program [facility] [place] [address]. . #[phone] .  -Pt. Will be provided a psychiatry appointment once he completes intake today.  -Pt. Can follow-up with [facility] [id number] [place] [address],[facility] Phone: [phone] for intake orientation on Tuesday and/ or Thursday @ 3:00 p.m. Pt. Is going to be cab to the [employer] .       Copy of D/C summary to: None     Signed:  Nery Owen MD  6/25/2017  2:57 PM

## 2022-03-13 NOTE — BSMART NOTE
OCCUPATIONAL THERAPY PROGRESS NOTE  Group Time:  [id number]  Attendance: The patient attended full group. Participation:  The patient participated with moderate elaboration in the activity. Attention:  The patient was able to focus on the activity. Interaction:  The patient frequently interacts with others.   Somewhat superficial. show

## 2024-05-04 NOTE — BH NOTES
Behavioral Health Progress Note          6/15/2017      Americo Callaway      Current Diagnosis: AXIS I: F 33.1 Major Depressive disorder, recurrent, moderate, F10.10 Alcohol use disorder, r/o substance induced mood disorder      Report from the nursing staff changes in the medical condition while patient has been on the unit:         Recent Results    No results found for this or any previous visit (from the past 24 hour(s)).       Sleep: shows no evidence of impairment.      Interval History:  Patient is a 61 y.o. single  male with a history of multiple past psychiatric hospitalizations for depressed mood with suicidal ideation and gestures. Patient suffers from alcohol use disorder, when intoxicated exacerbates his depression, hopelessness, worthlessness and helplessness. Patient arrived in the ED the day prior admission intoxicated with blood alcohol level 0.229, voicing worsening depression with suicidal ideation. Patient stated that he spent 2 months at the [facility] substance abuse program, later moving to Transitional housing,then to a hotel room. Patient expressed \" it was hard for me to be back in the real world, one 6 pack and I was right back to being drunk every day. \" Patient expressed that he was feeling really bad about himself and might as well die. He was admitted for safety, treatment and stabilization.      Patient has been cooperative with treatment; he has been polite and has been future oriented. He stated he hopes to resume AA meetings and attend outpatient substance abuse program at the [address] [place] [address]. Patient stated he has been drinking since age 14, and at this time he finds no foster in drinking. He seems to be tolerating withdrawal from alcohol and tolerating his medication regimen well. No adverse side effects noted, his vital signs have been stable. Will continue with CIWA protocol, Will continue to monitor his progress. Patient is future oriented, but at times feels hopeless.  Patient would like to have a safe place to live and to be able to afford his food and rent, but wonders who would employ him, as he believes himself to be older and weaker that the competition. He does have two grown daughters but does not want to be a burden to them. Patient denied SI/HI, currently denies AVH or tactile hallucinations. Overall continued sustained improvement in mood since admission.     No interval changed today. Patient's mood seems to be improving and he is more interactive with peers on the unit. He seems to have tolerated medications added yesterday for mood and for alcohol cravings, denies any adverse side effects. He denies SI/HI/AVH today.      Mental Status Exam: improved mood, less depressed, Affect mood congruent/restrictive. I/J remain adequate.      Treatment Plan:  -[facility] protocol in place  - will start celexa 10 mg po daily  -will start acamprosate 333 mg po TID  - will explore housing options and VA benefits  - will continue to explore availability of SARTP at the [address] [place] [address] on behalf of patient.   - spoke to Mr. Gautam Back at the [address] [facility] in [place], no available beds as yet, exploring the   Domiciliary program  Anticipated Discharge: 2-3 days.         Korin Orona MD  6/15/2017            You can access the FollowMyHealth Patient Portal offered by [facility] by registering at the following website: [email or web address]. By joining [employer]’s FollowMyHealth portal, you will also be able to view your health information using other applications (apps) compatible with our system.